# Patient Record
Sex: MALE | Race: OTHER | NOT HISPANIC OR LATINO | URBAN - METROPOLITAN AREA
[De-identification: names, ages, dates, MRNs, and addresses within clinical notes are randomized per-mention and may not be internally consistent; named-entity substitution may affect disease eponyms.]

---

## 2017-12-21 ENCOUNTER — INPATIENT (INPATIENT)
Facility: HOSPITAL | Age: 57
LOS: 3 days | Discharge: HOME | DRG: 60 | End: 2017-12-25
Attending: PSYCHIATRY & NEUROLOGY | Admitting: PSYCHIATRY & NEUROLOGY
Payer: SELF-PAY

## 2017-12-21 VITALS
HEART RATE: 64 BPM | TEMPERATURE: 98 F | OXYGEN SATURATION: 100 % | RESPIRATION RATE: 74 BRPM | WEIGHT: 186.95 LBS | SYSTOLIC BLOOD PRESSURE: 140 MMHG | DIASTOLIC BLOOD PRESSURE: 83 MMHG | HEIGHT: 68 IN

## 2017-12-21 LAB
ALBUMIN SERPL ELPH-MCNC: 4.4 G/DL — SIGNIFICANT CHANGE UP (ref 3.3–5)
ALP SERPL-CCNC: 58 U/L — SIGNIFICANT CHANGE UP (ref 40–120)
ALT FLD-CCNC: 17 U/L RC — SIGNIFICANT CHANGE UP (ref 10–45)
ANION GAP SERPL CALC-SCNC: 12 MMOL/L — SIGNIFICANT CHANGE UP (ref 5–17)
AST SERPL-CCNC: 17 U/L — SIGNIFICANT CHANGE UP (ref 10–40)
BASOPHILS # BLD AUTO: 0 K/UL — SIGNIFICANT CHANGE UP (ref 0–0.2)
BASOPHILS NFR BLD AUTO: 0.3 % — SIGNIFICANT CHANGE UP (ref 0–2)
BILIRUB SERPL-MCNC: 0.2 MG/DL — SIGNIFICANT CHANGE UP (ref 0.2–1.2)
BUN SERPL-MCNC: 20 MG/DL — SIGNIFICANT CHANGE UP (ref 7–23)
CALCIUM SERPL-MCNC: 9.7 MG/DL — SIGNIFICANT CHANGE UP (ref 8.4–10.5)
CHLORIDE SERPL-SCNC: 104 MMOL/L — SIGNIFICANT CHANGE UP (ref 96–108)
CK SERPL-CCNC: 132 U/L — SIGNIFICANT CHANGE UP (ref 30–200)
CO2 SERPL-SCNC: 24 MMOL/L — SIGNIFICANT CHANGE UP (ref 22–31)
CREAT SERPL-MCNC: 0.84 MG/DL — SIGNIFICANT CHANGE UP (ref 0.5–1.3)
CRP SERPL-MCNC: <0.2 MG/DL — SIGNIFICANT CHANGE UP (ref 0–0.4)
EOSINOPHIL # BLD AUTO: 0.3 K/UL — SIGNIFICANT CHANGE UP (ref 0–0.5)
EOSINOPHIL NFR BLD AUTO: 4.1 % — SIGNIFICANT CHANGE UP (ref 0–6)
ERYTHROCYTE [SEDIMENTATION RATE] IN BLOOD: 5 MM/HR — SIGNIFICANT CHANGE UP (ref 0–20)
GLUCOSE SERPL-MCNC: 95 MG/DL — SIGNIFICANT CHANGE UP (ref 70–99)
HCT VFR BLD CALC: 44.7 % — SIGNIFICANT CHANGE UP (ref 39–50)
HGB BLD-MCNC: 15.2 G/DL — SIGNIFICANT CHANGE UP (ref 13–17)
LYMPHOCYTES # BLD AUTO: 2.8 K/UL — SIGNIFICANT CHANGE UP (ref 1–3.3)
LYMPHOCYTES # BLD AUTO: 38.9 % — SIGNIFICANT CHANGE UP (ref 13–44)
MCHC RBC-ENTMCNC: 30.8 PG — SIGNIFICANT CHANGE UP (ref 27–34)
MCHC RBC-ENTMCNC: 34 GM/DL — SIGNIFICANT CHANGE UP (ref 32–36)
MCV RBC AUTO: 90.8 FL — SIGNIFICANT CHANGE UP (ref 80–100)
MONOCYTES # BLD AUTO: 0.5 K/UL — SIGNIFICANT CHANGE UP (ref 0–0.9)
MONOCYTES NFR BLD AUTO: 6.5 % — SIGNIFICANT CHANGE UP (ref 2–14)
NEUTROPHILS # BLD AUTO: 3.7 K/UL — SIGNIFICANT CHANGE UP (ref 1.8–7.4)
NEUTROPHILS NFR BLD AUTO: 50.2 % — SIGNIFICANT CHANGE UP (ref 43–77)
PLATELET # BLD AUTO: 245 K/UL — SIGNIFICANT CHANGE UP (ref 150–400)
POTASSIUM SERPL-MCNC: 4.1 MMOL/L — SIGNIFICANT CHANGE UP (ref 3.5–5.3)
POTASSIUM SERPL-SCNC: 4.1 MMOL/L — SIGNIFICANT CHANGE UP (ref 3.5–5.3)
PROT SERPL-MCNC: 7.6 G/DL — SIGNIFICANT CHANGE UP (ref 6–8.3)
RBC # BLD: 4.92 M/UL — SIGNIFICANT CHANGE UP (ref 4.2–5.8)
RBC # FLD: 11.9 % — SIGNIFICANT CHANGE UP (ref 10.3–14.5)
SODIUM SERPL-SCNC: 140 MMOL/L — SIGNIFICANT CHANGE UP (ref 135–145)
WBC # BLD: 7.3 K/UL — SIGNIFICANT CHANGE UP (ref 3.8–10.5)
WBC # FLD AUTO: 7.3 K/UL — SIGNIFICANT CHANGE UP (ref 3.8–10.5)

## 2017-12-21 PROCEDURE — 70450 CT HEAD/BRAIN W/O DYE: CPT | Mod: 26

## 2017-12-21 PROCEDURE — 99220: CPT

## 2017-12-21 RX ORDER — SODIUM CHLORIDE 9 MG/ML
3 INJECTION INTRAMUSCULAR; INTRAVENOUS; SUBCUTANEOUS EVERY 12 HOURS
Qty: 0 | Refills: 0 | Status: DISCONTINUED | OUTPATIENT
Start: 2017-12-21 | End: 2017-12-25

## 2017-12-21 RX ORDER — IBUPROFEN 200 MG
600 TABLET ORAL ONCE
Qty: 0 | Refills: 0 | Status: COMPLETED | OUTPATIENT
Start: 2017-12-21 | End: 2017-12-21

## 2017-12-21 RX ADMIN — SODIUM CHLORIDE 3 MILLILITER(S): 9 INJECTION INTRAMUSCULAR; INTRAVENOUS; SUBCUTANEOUS at 23:16

## 2017-12-21 RX ADMIN — Medication 600 MILLIGRAM(S): at 23:17

## 2017-12-21 RX ADMIN — Medication 600 MILLIGRAM(S): at 23:44

## 2017-12-21 NOTE — ED PROVIDER NOTE - MEDICAL DECISION MAKING DETAILS
Attending MD Rivera: 57M with 4 days of worsening b/l LE burning/shooting pain and weakness, exam reveals perhaps multilevel weakness LLE, no obvious weakness RLE, reflexes full, ddx includes transverse myelitis, acute cord process, less likely Guillain Turpin given preserved reflexes but could be early in disease. Neurology consulted and will see patient in ED to assist in directed diagnostics

## 2017-12-21 NOTE — ED PROVIDER NOTE - PROGRESS NOTE DETAILS
Dr Hardin cell phone [phone] please call with updates. -Noreen DUNLAP Neuro resident Jamila: does not appreciate significant weakness on focused exam, but functionally weak, not GB, unlikely transverse myelitis, thinks probably slight herniated disc. will speak with attending for possible admission vs CDU for MRI -S Attending MD Rivera: Dr. Hardin  called and he was updated on case

## 2017-12-21 NOTE — ED PROVIDER NOTE - NS ED ROS FT
ROS: no CP/SOB. no cough. no fever. no n/v/d/c. no abd pain. no rash. no bleeding. no urinary complaints. +weakness. no vision changes. no HA. no neck/back pain. no extremity swelling/deformity. No change in mental status.

## 2017-12-21 NOTE — ED PROVIDER NOTE - PHYSICAL EXAMINATION
Attending MD Rivera: A & O x 3, NAD, EOMI b/l, PERRL b/l; lungs CTAB, heart with reg rhythm without murmur; abdomen soft NTND; extremities with no edema; affect appropriate. 5/5 strength RLE in all muscle groups, 4+/5 strength LLE in all muscle groups, No midline spinal tenderness to palpation. 5/5 strength in bilateral lower extremities, sensation grossly intact to light touch throughout bilateral lower extremities 2+ patellar and Achilles reflexes bilaterally

## 2017-12-21 NOTE — ED ADULT NURSE NOTE - OBJECTIVE STATEMENT
57 y.o male presents to the ed c.o b/l lower extremity weakness for four days. denies medical history, appears well. pt states that his legs are burning him, on the right lower extremity its the lateral aspect of his calf and on the left lower extremity the burning starts in his foot and works up his calf. patient's reflexes are intact b/l and muscles are strong. pt is able to ambulate, gait is steady. no neurological deficits noted. PCP Lauri sent the patient in for r/o [place]. denies chest pain, sob, ha, n/v/d, abdominal pain, f/c, urinary symptoms, hematuria. states he had a cough recently, non productive. lung sounds are clear.  Patient undressed and placed into gown, call bell in hand and side rails up for safety. warm blanket provided, vital signs stable, pt in no acute distress.

## 2017-12-21 NOTE — CONSULT NOTE ADULT - ASSESSMENT
58 y/o man with no sig PMH p/w 5 days of bilateral weakness and paresthesias. On exam there was no weakness to direct muscle strength testing, but there was quadriceps and possibly gluteal weakness on functional testing. No sensory deficits were appreciated. Distribution of paresthesias in the upper lateral thigh down into the foot suggest both femoral and sciatic nerve involvement  which includes L3-S1. The leg weakness would suggest L3-L4 root involvement. Pattern of weakness and increased reflexes argues against GBS, and lack of sensory level against transverse myelitis. Possible lumbar plexopathy given distribution. Possible radiculopathy but at this point unlikely. Cord compression also much less likely. Would also rule out parasaggital brain lesion as this can also present with bilateral LE weakness.       Recommend:    - CTH   - MRI lumbar sacral spine w/wo contrast.  - No indication for steroids at this time.   - If no significant pathology, would have patient follow up by next week either at [facility] [phone], or at [facility] [phone] 56 y/o man with no sig PMH p/w 5 days of bilateral weakness and paresthesias. On exam there was no weakness to direct muscle strength testing, but there was quadriceps and possibly gluteal weakness on functional testing. No sensory deficits were appreciated. Distribution of paresthesias in the upper lateral thigh down into the foot along with the findings in the reflexes suggest spinal cord involvement.        Recommend:    - CTH   - MRI spine w/wo contrast.  - No indication for steroids at this time. If positive for TM, will need full w/u with MRI brain and steroids after.  -Admit to neuro

## 2017-12-21 NOTE — ED PROVIDER NOTE - OBJECTIVE STATEMENT
56yo M with LE weakness x4-5 days getting worse, can't stand on one leg, had significant trouble getting up from bed this AM. no UE weakness. no SOB. had some rt sided rib pain yesterday. no trauma. no back pain. no pain down legs. no urinary/bladder incontinence. no paresthesias LE. 3 months ago had URI, no recent GI illness. no recent travel. no fevers. no muscle cramping     PCP- Max Lashawn

## 2017-12-21 NOTE — ED PROVIDER NOTE - ATTENDING CONTRIBUTION TO CARE
Attending MD Rivera:  I personally have seen and examined this patient.  Resident note reviewed and agree on plan of care and except where noted.  See HPI, PE, and MDM for details.

## 2017-12-21 NOTE — CONSULT NOTE ADULT - SUBJECTIVE AND OBJECTIVE BOX
RAAD Wood is a 57y old  Male who presents with a chief complaint of leg weakness    HPI:  56 y/o Kisw[place]ili man with no significant PMH p/w 5 days of bilateral leg weakness and burning sensation in legs and feet. Pt had gradual onset on weakness. He first noticed it while he was driving home from work, felt that he couldn't lift his leg up to push the gas pedal. Associated with burning sensation on the lateral side of his left leg down into the foot, also in the right foot across the side and top of the foot. He says the weakness comes and goes and is better at times. Denies any numbness in the legs, groin numbness, back pain, bowel or bladder incontinence. No recent fevers, URI, or diarrhea. Comes in to the ED today because he was sent by his primary doctor.       MEDICATIONS  (STANDING): none    MEDICATIONS  (PRN):    PAST MEDICAL & SURGICAL HISTORY:  No pertinent past medical history  No significant past surgical history    FAMILY HISTORY: Father with pacemaker    Allergies    Intolerances    SHx - No smoking, No ETOH, No drug abuse      REVIEW OF SYSTEMS:    Constitutional: No fever, weight loss, or fatigue  Eyes: No eye pain, visual disturbances, or discharge  ENMT:  No difficulty hearing, tinnitus, vertigo; No sinus or throat pain  Neck: No pain or stiffness  Respiratory: No cough, wheezing, or shortness of breath  Cardiovascular: No chest pain, palpitations, or leg swelling  Gastrointestinal: No abdominal pain, nausea, vomiting, diarrhea or constipation.   Genitourinary: No dysuria, frequency, hematuria, or incontinence  Neurological: As per HPI  Skin: No itching, burning, rashes, or lesions   Endocrine: No heat or cold intolerance; No hair loss  Musculoskeletal: No joint pain or swelling; No muscle, back, or extremity pain  Psychiatric: No depression, anxiety, mood swings, or difficulty sleeping        Vital Signs Last 24 Hrs  T(C): 37 (21 Dec 2017 17:44), Max: 37 (21 Dec 2017 17:44)  T(F): 98.6 (21 Dec 2017 17:44), Max: 98.6 (21 Dec 2017 17:44)  HR: 63 (21 Dec 2017 17:44) (63 - 64)  BP: 140/85 (21 Dec 2017 17:44) (140/83 - 140/85)  BP(mean): --  RR: 18 (21 Dec 2017 17:44) (18 - 74)  SpO2: 100% (21 Dec 2017 17:44) (100% - 100%)    General Exam:   General appearance: No acute distress    Back: no  paraspinal or central spine tenderness         Neurological Exam:  Mental Status: Orientated to self, date and place.  Attention intact.  No dysarthria. Speech fluent.  Cranial Nerves:   PERRL, EOMI, VFF, no nystagmus.    CN V1-3 intact to light touch .  No facial asymmetry.  Hearing intact bilaterally.  Tongue  midline.  Sternocleidomastoid and Trapezius intact bilaterally.    Motor:   Tone: normal.                  Strength:     [] Upper extremity                      Delt       Bicep    Tricep                                                  R         5/5        5/5        5/5       5/5                                               L          5/5        5/5        5/5       5/5  [] Lower extremity                       HF          KE          KF        DF         PF                                               R        5/5        5/5        5/5       5/5       5/5                                               L         5/5        5/5       5/5       5/5        5/5  On Direct testing    On functional testing pt had difficulty with getting up from squat position.               Dysmetria: None to finger-nose-finger or heel-shin-heel  No truncal ataxia.    Tremor: No resting, postural or action tremor.  No myoclonus.    Sensation: intact to light touch, pinprick, vibration and proprioception throughout, no sensory level.     Deep Tendon Reflexes:     Biceps          Triceps      BR        Patellar        Ankle         Babinski                                  R       2+                   2+           2+            3+               3+           downgoing                                  L        2+                  2+           2+            3+               2+           downgoing    Gait: normal.      Other:    12-21    140  |  104  |  20  ----------------------------<  95  4.1   |  24  |  0.84    Ca    9.7      21 Dec 2017 17:34    TPro  7.6  /  Alb  4.4  /  TBili  0.2  /  DBili  x   /  AST  17  /  ALT  17  /  AlkPhos  58  12-21    ESR   5                        15.2   7.3   )-----------( 245      ( 21 Dec 2017 17:33 )             44.7       Radiology    CT: pending  MRI: pending RAAD Wood is a 57y old  Male who presents with a chief complaint of leg weakness    HPI:  56 y/o Vietnamese man with no significant PMH p/w 5 days of bilateral leg weakness and burning sensation in legs and feet. Pt had gradual onset on weakness. He first noticed it while he was driving home from work, felt that he couldn't lift his leg up to push the gas pedal. Associated with burning sensation on the lateral side of his left leg down into the foot, also in the right foot across the side and top of the foot. He says the weakness comes and goes and is better at times. Denies any numbness in the legs, groin numbness, back pain, bowel or bladder incontinence. No recent fevers, URI, or diarrhea. Comes in to the ED today because he was sent by his primary doctor.       MEDICATIONS  (STANDING): none    MEDICATIONS  (PRN):    PAST MEDICAL & SURGICAL HISTORY:  No pertinent past medical history  No significant past surgical history    FAMILY HISTORY: Father with pacemaker    Allergies    Intolerances    SHx - No smoking, No ETOH, No drug abuse      REVIEW OF SYSTEMS:    Constitutional: No fever, weight loss, or fatigue  Eyes: No eye pain, visual disturbances, or discharge  ENMT:  No difficulty hearing, tinnitus, vertigo; No sinus or throat pain  Neck: No pain or stiffness  Respiratory: No cough, wheezing, or shortness of breath  Cardiovascular: No chest pain, palpitations, or leg swelling  Gastrointestinal: No abdominal pain, nausea, vomiting, diarrhea or constipation.   Genitourinary: No dysuria, frequency, hematuria, or incontinence  Neurological: As per HPI  Skin: No itching, burning, rashes, or lesions   Endocrine: No heat or cold intolerance; No hair loss  Musculoskeletal: No joint pain or swelling; No muscle, back, or extremity pain  Psychiatric: No depression, anxiety, mood swings, or difficulty sleeping        Vital Signs Last 24 Hrs  T(C): 37 (21 Dec 2017 17:44), Max: 37 (21 Dec 2017 17:44)  T(F): 98.6 (21 Dec 2017 17:44), Max: 98.6 (21 Dec 2017 17:44)  HR: 63 (21 Dec 2017 17:44) (63 - 64)  BP: 140/85 (21 Dec 2017 17:44) (140/83 - 140/85)  BP(mean): --  RR: 18 (21 Dec 2017 17:44) (18 - 74)  SpO2: 100% (21 Dec 2017 17:44) (100% - 100%)    General Exam:   General appearance: No acute distress    Back: no  paraspinal or central spine tenderness         Neurological Exam:  Mental Status: Orientated to self, date and place.  Attention intact.  No dysarthria. Speech fluent.  Cranial Nerves:   PERRL, EOMI, VFF, no nystagmus.    CN V1-3 intact to light touch .  No facial asymmetry.  Hearing intact bilaterally.  Tongue  midline.  Sternocleidomastoid and Trapezius intact bilaterally.    Motor:   Tone: slightly increased lower ext.                  Strength:     [] Upper extremity                      Delt       Bicep    Tricep                                                  R         5/5        5/5        5/5       5/5                                               L          5/5        5/5        5/5       5/5  [] Lower extremity                       HF          KE          KF        DF         PF                                               R        5/5        5/5        5/5       5/5       5/5                                               L         5/5        5/5       5/5       5/5        5/5  On Direct testing    On functional testing pt had difficulty with getting up from squat position.               Dysmetria: None to finger-nose-finger or heel-shin-heel  No truncal ataxia.    Tremor: No resting, postural or action tremor.  No myoclonus.    Sensation: intact to light touch, pinprick, vibration and proprioception throughout, no sensory level.     Deep Tendon Reflexes:     Biceps          Triceps      BR        Patellar        Ankle         Babinski                                  R       2+                   2+           2+            3+               3+           up                                  L        2+                  2+           2+            3+               2+           up    Gait: normal.      Other:    12-21    140  |  104  |  20  ----------------------------<  95  4.1   |  24  |  0.84    Ca    9.7      21 Dec 2017 17:34    TPro  7.6  /  Alb  4.4  /  TBili  0.2  /  DBili  x   /  AST  17  /  ALT  17  /  AlkPhos  58  12-21    ESR   5                        15.2   7.3   )-----------( 245      ( 21 Dec 2017 17:33 )             44.7       Radiology    CT: pending  MRI: pending

## 2017-12-22 DIAGNOSIS — R29.898 OTHER SYMPTOMS AND SIGNS INVOLVING THE MUSCULOSKELETAL SYSTEM: ICD-10-CM

## 2017-12-22 LAB — ERYTHROCYTE [SEDIMENTATION RATE] IN BLOOD: 12 MM/HR — SIGNIFICANT CHANGE UP (ref 0–20)

## 2017-12-22 PROCEDURE — 70553 MRI BRAIN STEM W/O & W/DYE: CPT | Mod: 26

## 2017-12-22 PROCEDURE — 71260 CT THORAX DX C+: CPT | Mod: 26

## 2017-12-22 PROCEDURE — 72157 MRI CHEST SPINE W/O & W/DYE: CPT | Mod: 26

## 2017-12-22 PROCEDURE — 99217: CPT

## 2017-12-22 PROCEDURE — 99223 1ST HOSP IP/OBS HIGH 75: CPT

## 2017-12-22 PROCEDURE — 72158 MRI LUMBAR SPINE W/O & W/DYE: CPT | Mod: 26

## 2017-12-22 PROCEDURE — 74177 CT ABD & PELVIS W/CONTRAST: CPT | Mod: 26

## 2017-12-22 PROCEDURE — 72156 MRI NECK SPINE W/O & W/DYE: CPT | Mod: 26

## 2017-12-22 RX ORDER — INFLUENZA VIRUS VACCINE 15; 15; 15; 15 UG/.5ML; UG/.5ML; UG/.5ML; UG/.5ML
0.5 SUSPENSION INTRAMUSCULAR ONCE
Qty: 0 | Refills: 0 | Status: DISCONTINUED | OUTPATIENT
Start: 2017-12-22 | End: 2017-12-25

## 2017-12-22 RX ADMIN — SODIUM CHLORIDE 3 MILLILITER(S): 9 INJECTION INTRAMUSCULAR; INTRAVENOUS; SUBCUTANEOUS at 11:25

## 2017-12-22 RX ADMIN — SODIUM CHLORIDE 3 MILLILITER(S): 9 INJECTION INTRAMUSCULAR; INTRAVENOUS; SUBCUTANEOUS at 19:32

## 2017-12-22 NOTE — H&P ADULT - HISTORY OF PRESENT ILLNESS
58 y/o Surinamese man with no significant PMH p/w 5 days of bilateral leg weakness and burning sensation in legs and feet. Pt had gradual onset on weakness. He first noticed it while he was driving home from work, felt that he couldn't lift his leg up to push the gas pedal. Associated with burning sensation on the lateral side of his left leg down into the foot, also in the right foot across the side and top of the foot. He says the weakness comes and goes and is better at times. Denies any numbness in the legs, groin numbness, back pain, bowel or bladder incontinence. No recent fevers, URI, or diarrhea. Comes in to the ED today because he was sent by his primary doctor.     MRI spine w/ contrast showing Nonspecific abnormal signal intensity and enhancement of the right nory-thoracic cord at the level of T3-T4, may represent an infectious inflammatory or demyelinating process.   Patient admitted for w/u of transverse myelitis.

## 2017-12-22 NOTE — ED CDU PROVIDER SUBSEQUENT DAY NOTE - NEURO NEGATIVE STATEMENT, MLM
no loss of consciousness, no gait abnormality, no headache, no sensory deficits, and no weakness. no loss of consciousness, no gait abnormality, no headache, no sensory deficits, + weakness.

## 2017-12-22 NOTE — H&P ADULT - ASSESSMENT
56 y/o man with no sig PMH p/w 5 days of bilateral weakness and paresthesias. On exam there was no weakness to direct muscle strength testing, but there was quadriceps and possibly gluteal weakness on functional testing. No sensory deficits were appreciated. Distribution of paresthesias in the upper lateral thigh down into the foot along with the findings in the reflexes suggest spinal cord involvement.   MRI spine results suggestive of transverse myelitis.    Plan:  - MRI brain w/wo contrast for any demyelinating lesions  - LP for CSF analysis to confirm inflammatory/infectious etiology  - full rheumatological w/u

## 2017-12-22 NOTE — ED CDU PROVIDER INITIAL DAY NOTE - DETAILS
frequent reeval, vitals q 4hrs, tele, neurochecks q 4hrs, MRI L/S spine, neuro following  d/w attending

## 2017-12-22 NOTE — ED CDU PROVIDER DISPOSITION NOTE - ATTENDING CONTRIBUTION TO CARE
I performed a history and physical exam of the patient and discussed their management with the Advanced Care Practitioner. I reviewed the ACP's note and agree with the documented findings and plan of care.     Helio CARUSO: Patient is a 56 yo M here for b/l leg weakness x 5 days. No f/c/n/v/upper extremity weaknes, change in vision, headache. Patient here for MRI of spine to evaluate for transverse myelitis. exam: no focal neuro deficit a/p: r/o transverse myelitis; MRI concerning for thoracic transverse myelitis. Patient admitted per neuro recommendation.

## 2017-12-22 NOTE — ED CDU PROVIDER SUBSEQUENT DAY NOTE - MEDICAL DECISION MAKING DETAILS
Helio CARUSO: Patient is a 58 yo M here for b/l leg weakness x 5 days. No f/c/n/v/upper extremity weaknes, change in vision, headache. Patient here for MRI of spine to evaluate for transverse myelitis. exam: no focal neuro deficit a/p: r/o transverse myelitis; MRI per neuro

## 2017-12-22 NOTE — ED CDU PROVIDER INITIAL DAY NOTE - OBJECTIVE STATEMENT
56yo M no pmhx with c/o b/l LE weakness x 4-5 days constant- not improving. pt went to PCP who sent him here for evaluation. mild-moderate R sided pricking pain that started a couple days ago. otherwise no pain.      no UE weakness. no SOB. no cp. no trauma. no back pain. no pain down legs. no urinary/bladder incontinence. no paresthesias LE. 3 months ago had URI, no recent GI illness. no recent travel. no fevers. no muscle cramping     PCP- Max Lashawn

## 2017-12-22 NOTE — ED CDU PROVIDER INITIAL DAY NOTE - CHPI ED SYMPTOMS NEG
no change in level of consciousness/no blurred vision/no vomiting/no loss of consciousness/no nausea/no fever/no confusion/no dizziness/no numbness

## 2017-12-22 NOTE — H&P ADULT - MOTOR
Tone: slightly increased lower ext.                  Strength:     [] Upper extremity                      Delt       Bicep    Tricep                                                  R         5/5 5/5 5/5 5/5                                               L          5/5 5/5 5/5 5/5  [] Lower extremity                       HF          KE          KF        DF         PF                                               R        5/5 5/5 5/5 5/5 5/5                                               L         5/5 5/5 5/5 5/5 5/5  On Direct testing    On functional testing pt had difficulty with getting up from squat position.  Dysmetria: None to finger-nose-finger or heel-shin-heel  No truncal ataxia.    Tremor: No resting, postural or action tremor.  No myoclonus.  Deep Tendon Reflexes:     Biceps          Triceps      BR        Patellar        Ankle         Babinski                                  R       2+                   2+           2+            3+               3+           up                                  L        2+                  2+           2+            3+               2+           up

## 2017-12-22 NOTE — ED CDU PROVIDER INITIAL DAY NOTE - ATTENDING CONTRIBUTION TO CARE
Attending MD Rivera:   I personally have seen and examined this patient.  Physician assistant note reviewed and agree on plan of care and except where noted.  See HPI, PE, and MDM for details.

## 2017-12-22 NOTE — ED CDU PROVIDER DISPOSITION NOTE - CLINICAL COURSE
58yo M no pmhx with c/o b/l LE weakness x 4-5 days constant- not improving. pt went to PCP who sent him here for evaluation. mild-moderate R sided pricking pain that started a couple days ago. otherwise no pain. pt evaluation by Neurology, recommended pt be sent to CDU for observation including MRI. pt sent to CDU for neurologic checks q 4hrs and MRI. pt continued to have weakness to b/l LE overnight but no new or worsening of symptoms. 56yo M no pmhx with c/o b/l LE weakness x 4-5 days constant- not improving. pt went to PCP who sent him here for evaluation. mild-moderate R sided pricking pain that started a couple days ago. otherwise no pain. pt evaluation by Neurology, recommended pt be sent to CDU for observation including MRI. pt sent to CDU for neurologic checks q 4hrs and MRI. pt continued to have weakness to b/l LE overnight but no new or worsening of symptoms. Patient's MRI results significant for abnormal signal intensity and enhancement of the right nory-thoracic cord at the level of T3-T4, may represent an infectious inflammatory or demyelinating process. Pt re-evaluated by neurology and admitted to neuro service for transverse myelitis treatment and further workup.

## 2017-12-22 NOTE — ED ADULT NURSE REASSESSMENT NOTE - NS ED NURSE REASSESS COMMENT FT1
Pt admitted to [address] [place] [address] 473w report given to Kerry RN, VSS no complaints, neuro exam unchanged, pending transport. Safety and comfort maintained, family at bedside.
A/O x3, NAD ambulates to bathroom sans problems. Leg strong bilaterally, R > L,  both strong but weaker than hands. States he lifts his legs and walks ok, but can't lift his legs to get in and out of his car. See chart for neuro sheet.
Pt resting comfortably on stretcher, no complaints, VSS, neuro exam unchanged. Monitoring continues. Safety and comfort maintained.
Report received from Sia AMATO at 1900. Patient A&Ox3. Head CT negative. Patient placed in CDU, report given to Kira AMATO. VSS
Retuned from MRI, A/O x3, NAD, breakfast at bedside.
patient sitting up on stretcher, in no acute distress. pending neuro consult. see neuro paper sheet in chart.
@[id number] Rec'd report. A/O x3, NAD.
Received pt from LIMA Good , received pt alert and responsive, oriented x4, denies any respiratory distress, SOB, or difficulty breathing. Pt transferred to CDU for observation for BLE weakness, denies any other neuro symptoms, neuro intact. c/o pain to R underarm will medicate as ordered, IV in place, patent and free of signs of infiltration, pt denies chest pain or palpitations, V/S stable, pt afebrile, Pt educated on unit and unit rules, instructed patient to notify RN of any needed assistance, Pt verbalizes understanding, Call bell placed within reach. Safety maintained. Will continue to monitor. Pending MRI.

## 2017-12-22 NOTE — ED CDU PROVIDER SUBSEQUENT DAY NOTE - HISTORY
CDU NOTE JUAN Jackson: pt resting comfortably, feels well without complaint. pt reports Ibuprofen helped relieve pain at R side, pain resolved. pt does report some improvement of b/l leg weakness. no new symptoms. denies cp/sob/dyspnea, dizziness, numbness. NAD recent VSS. neuro exam- no focal deficit. M/S intact x 4.

## 2017-12-22 NOTE — ED CDU PROVIDER DISPOSITION NOTE - PLAN OF CARE
1. stay hydrated.  2. rest.  3. Follow up with your PCP Dr. Hardin in 1 -2 days.  4. Follow up with Neurology #[phone] in 2-3 days.  5. Bring Printed Results to your Doctor Visits.   6. Return if symptoms worsen, fever, weakness, numbness, dizziness, vision change, slurred speech and all other concerns

## 2017-12-22 NOTE — ED CDU PROVIDER SUBSEQUENT DAY NOTE - PROGRESS NOTE DETAILS
CDU NOTE JUAN Jackson: pt asleep. NAD. Patient seen and evaluated at bedside. Resting comfortably, NAD. VSS. Neuro exam WNL, no weakness appreciated at this time. Pt seen by neurology team and attending, requesting MRI of entire spine. Patient resting comfortably. NAD. No complaints. VSS. Awaiting MRI results. Patient's MRI results significant for abnormal signal intensity and enhancement of the right nory-thoracic cord at the level of T3-T4, may represent an infectious inflammatory or demyelinating process. Pt re-evaluated by neurology and to be admitted to neuro service for transverse myelitis. D/w Dr. Cadet.

## 2017-12-22 NOTE — H&P ADULT - CRANIAL NERVE
PERRL, EOMI, VFF, no nystagmus.    CN V1-3 intact to light touch .  No facial asymmetry.  Hearing intact bilaterally.  Tongue  midline.  Sternocleidomastoid and Trapezius intact bilaterally.

## 2017-12-23 ENCOUNTER — RESULT REVIEW (OUTPATIENT)
Age: 57
End: 2017-12-23

## 2017-12-23 LAB
APPEARANCE CSF: CLEAR — SIGNIFICANT CHANGE UP
APPEARANCE CSF: CLEAR — SIGNIFICANT CHANGE UP
B BURGDOR C6 AB SER-ACNC: NEGATIVE — SIGNIFICANT CHANGE UP
B BURGDOR IGG+IGM SER-ACNC: 0.15 INDEX — SIGNIFICANT CHANGE UP (ref 0.01–0.89)
CERULOPLASMIN SERPL-MCNC: 24 MG/DL — SIGNIFICANT CHANGE UP (ref 20–60)
COLOR CSF: SIGNIFICANT CHANGE UP
COLOR CSF: SIGNIFICANT CHANGE UP
CRP SERPL-MCNC: <0.2 MG/DL — SIGNIFICANT CHANGE UP (ref 0–0.4)
DSDNA AB FLD-ACNC: <0.2 AI — SIGNIFICANT CHANGE UP
ENA SS-A AB FLD IA-ACNC: <0.2 AI — SIGNIFICANT CHANGE UP
GLUCOSE BLDC GLUCOMTR-MCNC: 114 MG/DL — HIGH (ref 70–99)
GLUCOSE CSF-MCNC: 71 MG/DL — HIGH (ref 40–70)
GRAM STN FLD: SIGNIFICANT CHANGE UP
LABORATORY COMMENT REPORT: SIGNIFICANT CHANGE UP
LYMPHOCYTES # CSF: 88 % — HIGH (ref 40–80)
LYMPHOCYTES # CSF: 95 % — HIGH (ref 40–80)
MONOS+MACROS NFR CSF: 5 % — LOW (ref 15–45)
MONOS+MACROS NFR CSF: 9 % — LOW (ref 15–45)
NEUTROPHILS # CSF: 0 % — SIGNIFICANT CHANGE UP (ref 0–6)
NEUTROPHILS # CSF: 3 % — SIGNIFICANT CHANGE UP (ref 0–6)
NIGHT BLUE STAIN TISS: SIGNIFICANT CHANGE UP
NRBC NFR CSF: 2 /UL — SIGNIFICANT CHANGE UP (ref 0–5)
NRBC NFR CSF: 3 /UL — SIGNIFICANT CHANGE UP (ref 0–5)
PROT CSF-MCNC: 39 MG/DL — SIGNIFICANT CHANGE UP (ref 15–45)
RBC # CSF: 0 /UL — SIGNIFICANT CHANGE UP (ref 0–0)
RBC # CSF: 77 /UL — HIGH (ref 0–0)
RHEUMATOID FACT SERPL-ACNC: <7 IU/ML — SIGNIFICANT CHANGE UP (ref 0–13.9)
SOURCE HSV 1/2: SIGNIFICANT CHANGE UP
SPECIMEN SOURCE: SIGNIFICANT CHANGE UP
SPECIMEN SOURCE: SIGNIFICANT CHANGE UP
T PALLIDUM AB TITR SER: NEGATIVE — SIGNIFICANT CHANGE UP
TSH SERPL-MCNC: 4.82 UIU/ML — HIGH (ref 0.27–4.2)
TUBE TYPE: SIGNIFICANT CHANGE UP
TUBE TYPE: SIGNIFICANT CHANGE UP
VIT B12 SERPL-MCNC: 452 PG/ML — SIGNIFICANT CHANGE UP (ref 232–1245)
VIT D25+D1,25 OH+D1,25 PNL SERPL-MCNC: 68.3 PG/ML — SIGNIFICANT CHANGE UP (ref 19.9–79.3)

## 2017-12-23 PROCEDURE — 88188 FLOWCYTOMETRY/READ 9-15: CPT

## 2017-12-23 PROCEDURE — 88108 CYTOPATH CONCENTRATE TECH: CPT | Mod: 26,59

## 2017-12-23 PROCEDURE — 99233 SBSQ HOSP IP/OBS HIGH 50: CPT

## 2017-12-23 RX ORDER — PANTOPRAZOLE SODIUM 20 MG/1
40 TABLET, DELAYED RELEASE ORAL
Qty: 0 | Refills: 0 | Status: DISCONTINUED | OUTPATIENT
Start: 2017-12-23 | End: 2017-12-25

## 2017-12-23 RX ORDER — INSULIN LISPRO 100/ML
VIAL (ML) SUBCUTANEOUS
Qty: 0 | Refills: 0 | Status: DISCONTINUED | OUTPATIENT
Start: 2017-12-23 | End: 2017-12-25

## 2017-12-23 RX ADMIN — Medication 58 MILLIGRAM(S): at 16:41

## 2017-12-23 RX ADMIN — SODIUM CHLORIDE 3 MILLILITER(S): 9 INJECTION INTRAMUSCULAR; INTRAVENOUS; SUBCUTANEOUS at 16:44

## 2017-12-23 RX ADMIN — SODIUM CHLORIDE 3 MILLILITER(S): 9 INJECTION INTRAMUSCULAR; INTRAVENOUS; SUBCUTANEOUS at 05:00

## 2017-12-23 RX ADMIN — PANTOPRAZOLE SODIUM 40 MILLIGRAM(S): 20 TABLET, DELAYED RELEASE ORAL at 19:22

## 2017-12-23 NOTE — PROGRESS NOTE ADULT - SUBJECTIVE AND OBJECTIVE BOX
Neurology Progress Note    No acute overnight events. Pt denies new weakness, bowel/bladder incontinence.     MEDICATIONS  (STANDING):  influenza   Vaccine 0.5 milliLiter(s) IntraMuscular once  sodium chloride 0.9% lock flush 3 milliLiter(s) IV Push every 12 hours    Objective:   Vital Signs Last 24 Hrs  T(C): 36.6 (23 Dec 2017 05:07), Max: 37.1 (22 Dec 2017 16:00)  T(F): 97.9 (23 Dec 2017 05:07), Max: 98.7 (22 Dec 2017 16:00)  HR: 58 (23 Dec 2017 05:07) (58 - 68)  BP: 138/88 (23 Dec 2017 05:07) (124/82 - 150/90)  BP(mean): --  RR: 18 (23 Dec 2017 05:07) (17 - 18)  SpO2: 96% (23 Dec 2017 05:07) (96% - 98%)    General Adult Exam  GENERAL APPEARANCE: Well developed, well nourished, alert and cooperative, and appears to be in no acute distress.    Neurological Exam:  Mental Status: AAOx3, speech fluent, follows commands    Cranial Nerves: PERRL, EOMI, VFF, no facial asymmetry. Tongue, uvula and palate midline. No dysarthria.    Motor:   Tone: normal.                  Strength:     [] Upper extremity                           Delt       Bicep    Tricep                                                  R         5/5        5/5        5/5       5/5                                               L          5/5        5/5        5/5       5/5  [] Lower extremity                       HF          KE          KF        DF         PF                                               R        5/5        5/5        5/5       5/5       5/5                                               L         5/5        5/5       5/5       5/5        5/5  Tremor: No resting, postural or action tremor.  No myoclonus.    Sensation: intact to light touch and pinprick x 4 extremities. No sensory level.     Deep Tendon Reflexes: 2+ bilateral biceps, triceps, brachioradialis, 3+ b/l knee, 2+ L ankle, 3+ R ankle   Toes extensor b/l    Coord: No ataxia/dysmetria FTN b/l    Other:    Borrelia burgdorferi IgG/IgM Antibodies (12.22.17 @ 21:59)    LYME IgG/IgM Antibodies Result: 0.15 Index    Lyme C6 Interpretation: Negative: METHOD: KASANDRA      Reference Range: (values expressed as Lyme Index )                                < 0.90        Negative                                0.90 - 1.09   Equivocal                                >= 1.10        Positive      CDC/ASTPHLDGuidelines recommend that all samples judged equivocal or      positive be re-tested by immunoblot for confirmation of results.    Vitamin B12, Serum (12.22.17 @ 21:59)    Vitamin B12, Serum: 452: Note: Reference Range Change on 12/18/2017. pg/mL    Sedimentation Rate, Erythrocyte: 12 mm/hr (12.22.17 @ 22:05)    Thyroid Stimulating Hormone, Serum (12.22.17 @ 21:59)    Thyroid Stimulating Hormone, Serum: 4.82 uIU/mL    Radiology    < from: MR Thoracic Spine w/wo IV Cont (12.22.17 @ 10:41) >  IMPRESSION:      CERVICAL SPINE:  Unremarkable cervical spine MRI, without abnormal cord signal intensity   or enhancement.    THORACIC SPINE:  Nonspecific abnormal signal intensity and enhancement of the right   nory-thoracic cord at the level of T3-T4, may represent an infectious   inflammatory or demyelinating process. No richard thoracic cord compression.    Multilevel small disc protrusions, which do not contact the cord.    LUMBAR SPINE:  Unremarkable limited lumbar spine MRI    CT C/A/P: Prelim no emergent findings Neurology Progress Note    No acute overnight events. Pt denies new weakness, bowel/bladder incontinence.     MEDICATIONS  (STANDING):  influenza   Vaccine 0.5 milliLiter(s) IntraMuscular once  sodium chloride 0.9% lock flush 3 milliLiter(s) IV Push every 12 hours    Objective:   Vital Signs Last 24 Hrs  T(C): 36.6 (23 Dec 2017 05:07), Max: 37.1 (22 Dec 2017 16:00)  T(F): 97.9 (23 Dec 2017 05:07), Max: 98.7 (22 Dec 2017 16:00)  HR: 58 (23 Dec 2017 05:07) (58 - 68)  BP: 138/88 (23 Dec 2017 05:07) (124/82 - 150/90)  BP(mean): --  RR: 18 (23 Dec 2017 05:07) (17 - 18)  SpO2: 96% (23 Dec 2017 05:07) (96% - 98%)    General Adult Exam  GENERAL APPEARANCE: Well developed, well nourished, alert and cooperative, and appears to be in no acute distress.    Neurological Exam:  Mental Status: AAOx3, speech fluent, follows commands    Cranial Nerves: PERRL, EOMI, VFF, no facial asymmetry. Tongue, uvula and palate midline. No dysarthria.    Motor:   Tone: normal.                  Strength:     [] Upper extremity                           Delt       Bicep    Tricep                                                  R         5/5        5/5        5/5       5/5                                               L          5/5        5/5        5/5       5/5  [] Lower extremity                       HF          KE          KF        DF         PF                                               R        5/5        5/5        5/5       5/5       5/5                                               L         5/5        5/5       5/5       5/5        5/5  Tremor: No resting, postural or action tremor.  No myoclonus.    Sensation: intact to light touch and pinprick x 4 extremities. No sensory level.     Deep Tendon Reflexes: 2+ bilateral biceps, triceps, brachioradialis, 3+ b/l knee, 2+ L ankle, 3+ R ankle   Toes extensor b/l. + 1-2 beats of clonus L ankle    Coord: No ataxia/dysmetria FTN b/l    Other:    Borrelia burgdorferi IgG/IgM Antibodies (12.22.17 @ 21:59)    LYME IgG/IgM Antibodies Result: 0.15 Index    Lyme C6 Interpretation: Negative: METHOD: KASANDRA      Reference Range: (values expressed as Lyme Index )                                < 0.90        Negative                                0.90 - 1.09   Equivocal                                >= 1.10        Positive      CDC/ASTPHLDGuidelines recommend that all samples judged equivocal or      positive be re-tested by immunoblot for confirmation of results.    Vitamin B12, Serum (12.22.17 @ 21:59)    Vitamin B12, Serum: 452: Note: Reference Range Change on 12/18/2017. pg/mL    Sedimentation Rate, Erythrocyte: 12 mm/hr (12.22.17 @ 22:05)    Thyroid Stimulating Hormone, Serum (12.22.17 @ 21:59)    Thyroid Stimulating Hormone, Serum: 4.82 uIU/mL    Radiology    < from: MR Thoracic Spine w/wo IV Cont (12.22.17 @ 10:41) >  IMPRESSION:      CERVICAL SPINE:  Unremarkable cervical spine MRI, without abnormal cord signal intensity   or enhancement.    THORACIC SPINE:  Nonspecific abnormal signal intensity and enhancement of the right   nory-thoracic cord at the level of T3-T4, may represent an infectious   inflammatory or demyelinating process. No richard thoracic cord compression.    Multilevel small disc protrusions, which do not contact the cord.    LUMBAR SPINE:  Unremarkable limited lumbar spine MRI    MRI brain pending read    CT C/A/P: Prelim no emergent findings

## 2017-12-23 NOTE — PROGRESS NOTE ADULT - ASSESSMENT
56 y/o man with no significant PMH p/w 5 days of bilateral weakness and paresthesias. Exam reveals hyperreflexia and extensor plantar response concerning for myelopathy. MRI spine results suggestive of transverse myelitis in R nory-thoracic cord at T3-4. Neurologic exam is stable. Patient is undergoing continued workup for etiology- infectious vs inflammatory vs demyelinating.     - LP at bedside today   - F/u official read CT C/A/P  - F/u remaining rheumatologic workup 56 y/o man with no significant PMH p/w 5 days of bilateral weakness and paresthesias. Exam reveals hyperreflexia and extensor plantar response concerning for myelopathy. MRI spine results suggestive of transverse myelitis in R nory-thoracic cord at T3-4. Neurologic exam is stable. Patient is undergoing continued workup for etiology- infectious vs inflammatory vs demyelinating, autoimmune disorder such as MS higher on the differential given preliminary neuroimaging.     - LP at bedside today- CSF gluc, prot, cell count, gram stain, HSV, cytology, flow cytometry, MBP, IgG index, oligoclonal bands  - Send serum NMO Ab  - TSH mildly elevated- will check FT3/T4  - F/u official read CT C/A/P  - F/u official read MRI brain  - F/u remaining rheumatologic workup   - PT

## 2017-12-24 ENCOUNTER — TRANSCRIPTION ENCOUNTER (OUTPATIENT)
Age: 57
End: 2017-12-24

## 2017-12-24 LAB
ANA PAT FLD IF-IMP: ABNORMAL
ANA PATTERN 2: ABNORMAL
ANA TITR SER: ABNORMAL
ANTI NUCLEAR FACTOR TITER 2: ABNORMAL
AUTO DIFF PNL BLD: NEGATIVE — SIGNIFICANT CHANGE UP
C-ANCA SER-ACNC: NEGATIVE — SIGNIFICANT CHANGE UP
GLUCOSE BLDC GLUCOMTR-MCNC: 147 MG/DL — HIGH (ref 70–99)
GLUCOSE BLDC GLUCOMTR-MCNC: 162 MG/DL — HIGH (ref 70–99)
GLUCOSE BLDC GLUCOMTR-MCNC: 201 MG/DL — HIGH (ref 70–99)
GLUCOSE BLDC GLUCOMTR-MCNC: 237 MG/DL — HIGH (ref 70–99)
P-ANCA SER-ACNC: NEGATIVE — SIGNIFICANT CHANGE UP
T3FREE SERPL-MCNC: 2.52 PG/ML — SIGNIFICANT CHANGE UP (ref 1.8–4.6)
T4 AB SER-ACNC: 7 UG/DL — SIGNIFICANT CHANGE UP (ref 4.6–12)

## 2017-12-24 PROCEDURE — 99233 SBSQ HOSP IP/OBS HIGH 50: CPT

## 2017-12-24 RX ADMIN — Medication 2: at 13:04

## 2017-12-24 RX ADMIN — SODIUM CHLORIDE 3 MILLILITER(S): 9 INJECTION INTRAMUSCULAR; INTRAVENOUS; SUBCUTANEOUS at 05:20

## 2017-12-24 RX ADMIN — PANTOPRAZOLE SODIUM 40 MILLIGRAM(S): 20 TABLET, DELAYED RELEASE ORAL at 05:27

## 2017-12-24 RX ADMIN — Medication 58 MILLIGRAM(S): at 05:27

## 2017-12-24 RX ADMIN — SODIUM CHLORIDE 3 MILLILITER(S): 9 INJECTION INTRAMUSCULAR; INTRAVENOUS; SUBCUTANEOUS at 18:03

## 2017-12-24 RX ADMIN — Medication 2: at 08:36

## 2017-12-24 NOTE — CONSULT NOTE ADULT - SUBJECTIVE AND OBJECTIVE BOX
CHIEF COMPLAINT:    HPI:  56 yo M with no significant PMH. Presents with 5 days of bilateral leg weakness and burning sensation in legs and feet. Pt had gradual onset on weakness and Associated with burning sensation on the lateral side of his left leg down into the foot, also in the right foot across the side and top of the foot. He says the weakness comes and goes and is better at times. Denies any numbness in the legs, groin numbness, back pain, bowel or bladder incontinence. No recent fevers, URI, or diarrhea. Pt is being worked up for myelopathy. Incidental 3 mm GRAYSON nodule was seen.       PAST MEDICAL & SURGICAL HISTORY:  No pertinent past medical history  No significant past surgical history      FAMILY HISTORY:  Family history of coronary artery disease      SOCIAL HISTORY:  Smoking: [ ] Never Smoked [ ] Former Smoker (__ packs x ___ years) [ ] Current Smoker  (__ packs x ___ years)  Substance Use:   EtOH Use:  Marital Status: [ ] Single [ ]  [ ]  [ ]   Sexual History:   Occupation:  Recent Travel:  Country of Birth: [facility]    Allergies    No Known Allergies    Intolerances        HOME MEDICATIONS:    REVIEW OF SYSTEMS:  Constitutional: [ ] fevers [ ] chills [ ] weight loss [ ] weight gain  HEENT: [ ] dry eyes [ ] eye irritation [ ] postnasal drip [ ] nasal congestion  CV: [ ] chest pain [ ] orthopnea [ ] palpitations [ ] murmur  Resp: [ ] cough [ ] shortness of breath [ ] dyspnea [ ] wheezing [ ] sputum [ ] hemoptysis  GI: [ ] nausea [ ] vomiting [ ] diarrhea [ ] constipation [ ] abd pain [ ] dysphagia   : [ ] dysuria [ ] nocturia [ ] hematuria [ ] increased urinary frequency  Musculoskeletal: [ ] back pain [ ] myalgias [ ] arthralgias [ ] fracture  Skin: [ ] rash [ ] itch  Neurological: [ ] headache [ ] dizziness [ ] syncope [ ] weakness [ ] numbness  [ ] All other systems negative  [ ] Unable to assess ROS because ________    OBJECTIVE:  ICU Vital Signs Last 24 Hrs  T(C): 37.1 (24 Dec 2017 07:45), Max: 37.1 (24 Dec 2017 07:45)  T(F): 98.7 (24 Dec 2017 07:45), Max: 98.7 (24 Dec 2017 07:45)  HR: 100 (24 Dec 2017 07:45) (70 - 100)  BP: 120/82 (24 Dec 2017 07:45) (118/773 - 135/64)  BP(mean): --  ABP: --  ABP(mean): --  RR: 18 (24 Dec 2017 07:45) (16 - 18)  SpO2: 98% (24 Dec 2017 07:45) (95% - 98%)        CAPILLARY BLOOD GLUCOSE      POCT Blood Glucose.: 201 mg/dL (24 Dec 2017 07:52)      PHYSICAL EXAM:  General:   HEENT:   Lymph Nodes:  Neck:   Respiratory:   Cardiovascular:   Abdomen:   Extremities:   Skin:   Neurological:  Psychiatry:    HOSPITAL MEDICATIONS:  MEDICATIONS  (STANDING):  influenza   Vaccine 0.5 milliLiter(s) IntraMuscular once  insulin lispro (HumaLOG) corrective regimen sliding scale   SubCutaneous three times a day before meals  methylPREDNISolone sodium succinate IVPB 1000 milliGRAM(s) IV Intermittent daily  pantoprazole    Tablet 40 milliGRAM(s) Oral before breakfast  sodium chloride 0.9% lock flush 3 milliLiter(s) IV Push every 12 hours    MEDICATIONS  (PRN):      LABS:    Hgb Trend: 15.2<--        Creatinine Trend: 0.84<--            MICROBIOLOGY:     RADIOLOGY:  [ ] Reviewed and interpreted by me    PULMONARY FUNCTION TESTS:    EKG: CHIEF COMPLAINT:    HPI:  56 yo M with no significant PMH. Presents with 5 days of bilateral leg weakness and burning sensation in legs and feet. Denies any numbness in the legs, groin numbness, back pain, bowel or bladder incontinence. No recent fevers, URI, or diarrhea. Pt is being worked up for myelopathy, possible transverse myelitis. Incidental 3 mm GRAYSON nodule was seen on CT chest. Pt states that around 2 months ago he had a upper respiratory symptoms with a dry cough that has since resolved. He has no respiratory symptoms otherwise and no exercise limitations. He denies CP, cough, SOB, MATUTE, rash, joint pain or weight changes. He was born in [place] and immigrated in 1987. No hx of TB or TB exposure. No recent travel    PAST MEDICAL & SURGICAL HISTORY:  Kidney stone   No pertinent past medical history  No significant past surgical history      FAMILY HISTORY:  Family history of coronary artery disease      SOCIAL HISTORY:  Smoking: [x ] Never Smoked   Substance Use: denies  EtOH Use: denies  Marital Status: [ ] Single [ ]  [ ]  [ ]   Occupation: transportation dispatch  Recent Travel: none  Country of Birth: [place]    Allergies    No Known Allergies    Intolerances        HOME MEDICATIONS:  None    REVIEW OF SYSTEMS:  Constitutional: [- ] fevers [- ] chills [- ] weight loss [- ] weight gain  HEENT: [ ] dry eyes [ ] eye irritation [- ] postnasal drip [- ] nasal congestion  CV: [- ] chest pain [- ] orthopnea [ ] palpitations [ ] murmur  Resp: [- ] cough [ -] shortness of breath [- ] dyspnea [ ] wheezing [ -] sputum [ ] hemoptysis  GI: [ -] nausea [ -] vomiting [ -] diarrhea [ ] constipation [ -] abd pain [ ] dysphagia   Musculoskeletal: [- ] back pain [ ] myalgias [ ] arthralgias [ ] fracture  Skin: [ -] rash [ ] itch  Neurological: [ ] headache [ ] dizziness [ ] syncope [+ ] weakness [- ] numbness  [ ] All other systems negative  [ ] Unable to assess ROS because ________    OBJECTIVE:  ICU Vital Signs Last 24 Hrs  T(C): 37.1 (24 Dec 2017 07:45), Max: 37.1 (24 Dec 2017 07:45)  T(F): 98.7 (24 Dec 2017 07:45), Max: 98.7 (24 Dec 2017 07:45)  HR: 100 (24 Dec 2017 07:45) (70 - 100)  BP: 120/82 (24 Dec 2017 07:45) (118/773 - 135/64)  BP(mean): --  ABP: --  ABP(mean): --  RR: 18 (24 Dec 2017 07:45) (16 - 18)  SpO2: 98% (24 Dec 2017 07:45) (95% - 98%)        CAPILLARY BLOOD GLUCOSE      POCT Blood Glucose.: 201 mg/dL (24 Dec 2017 07:52)      PHYSICAL EXAM:  General: NAD, non-toxic appearing  HEENT: EOMI, MMM  Lymph Nodes: no cervical or supraclavicular LAD palpated  Neck: supple, no JVD  Respiratory: clear to auscultation b/l  Cardiovascular: s1s2 RRR  Abdomen: soft, non tender, mild distension  Extremities: warm, no edema, no clubbing  Skin: intact  Neurological: normal strength b/l UE/LE, non-focal  Psychiatry: AAOx3d    HOSPITAL MEDICATIONS:  MEDICATIONS  (STANDING):  influenza   Vaccine 0.5 milliLiter(s) IntraMuscular once  insulin lispro (HumaLOG) corrective regimen sliding scale   SubCutaneous three times a day before meals  methylPREDNISolone sodium succinate IVPB 1000 milliGRAM(s) IV Intermittent daily  pantoprazole    Tablet 40 milliGRAM(s) Oral before breakfast  sodium chloride 0.9% lock flush 3 milliLiter(s) IV Push every 12 hours    MEDICATIONS  (PRN):      LABS:    Hgb Trend: 15.2<--        Creatinine Trend: 0.84<--            MICROBIOLOGY:     RADIOLOGY:  [x ] Reviewed and interpreted by me  < from: CT Chest w/ IV Cont (12.22.17 @ 19:32) >  IMPRESSION:     Bilateral nonobstructing intrarenal calculi.    An indeterminate 3 mm left upper lobe pulmonary nodule. In a patient with   risk factors for which follow-up is desired, noncontrast chest CT may be   repeated in 12 months to confirm stability.    < end of copied text >

## 2017-12-24 NOTE — DISCHARGE NOTE ADULT - PROVIDER TOKENS
FREE:[LAST:[Markell],FIRST:[Guilherme],PHONE:[phone]],FAX:[phone]],ADDRESS:[[address] [place] [address]]]

## 2017-12-24 NOTE — PROGRESS NOTE ADULT - SUBJECTIVE AND OBJECTIVE BOX
Neurology Follow up note    Name  RAAD ESPINOSA        Subjective: Saw and examined patient at bedside. Patient reports that his weakness is much better. He contines to have burning sensation in the legs b/l but it has subsided since his steroid dose was given this morning.     MEDICATIONS  (STANDING):  influenza   Vaccine 0.5 milliLiter(s) IntraMuscular once  insulin lispro (HumaLOG) corrective regimen sliding scale   SubCutaneous three times a day before meals  methylPREDNISolone sodium succinate IVPB 1000 milliGRAM(s) IV Intermittent daily  pantoprazole    Tablet 40 milliGRAM(s) Oral before breakfast  sodium chloride 0.9% lock flush 3 milliLiter(s) IV Push every 12 hours    MEDICATIONS  (PRN):      Allergies    No Known Allergies    Intolerances        Objective:   Vital Signs Last 24 Hrs  T(C): 37.1 (24 Dec 2017 07:45), Max: 37.1 (24 Dec 2017 07:45)  T(F): 98.7 (24 Dec 2017 07:45), Max: 98.7 (24 Dec 2017 07:45)  HR: 100 (24 Dec 2017 07:45) (70 - 100)  BP: 120/82 (24 Dec 2017 07:45) (118/773 - 135/64)  BP(mean): --  RR: 18 (24 Dec 2017 07:45) (16 - 18)  SpO2: 98% (24 Dec 2017 07:45) (95% - 98%)    General Exam:   General appearance: No acute distress                   Mental Status: AAOx3, speech fluent, follows commands    Cranial Nerves: PERRL, EOMI, VFF, no facial asymmetry, tongue midline, No dysarthria.    Motor:   Tone: normal.                  Strength:   5/5 throughout    Sensation: intact to light touch and pinprick x 4 extremities. No sensory level.     Deep Tendon Reflexes: 2+ bilateral biceps, triceps, brachioradialis, 3+ b/l knee, 2+ L ankle, 3+ R ankle   Toes extensor b/l. + 1-2 beats of clonus L ankle    Coord: No ataxia/dysmetria FTN b/l      Other:   T4, seum- 7.0  Free Triiodothyronine, serum- 2.52    Borrelia burgdorferi IgG/IgM Antibodies (12.22.17 @ 21:59)    LYME IgG/IgM Antibodies Result: 0.15 Index    Lyme C6 Interpretation: Negative: METHOD: KASANDRA      Reference Range: (values expressed as Lyme Index )                                < 0.90        Negative                                0.90 - 1.09   Equivocal                                >= 1.10        Positive      CDC/ASTPHLDGuidelines recommend that all samples judged equivocal or      positive be re-tested by immunoblot for confirmation of results.    Vitamin B12, Serum (12.22.17 @ 21:59)    Vitamin B12, Serum: 452: Note: Reference Range Change on 12/18/2017. pg/mL    Sedimentation Rate, Erythrocyte: 12 mm/hr (12.22.17 @ 22:05)    Thyroid Stimulating Hormone, Serum (12.22.17 @ 21:59)    Thyroid Stimulating Hormone, Serum: 4.82 uIU/mL        Radiology    CT chest/abdomen/pelvis:  Bilateral nonobstructing intrarenal calculi.    An indeterminate 3 mm left upper lobe pulmonary nodule. In a patient with   risk factors for which follow-up is desired, noncontrast chest CT may be   repeated in 12 months to confirm stability.    < end of copied text >    MRI brain No acute intracranial hemorrhage, mass effect, or evidence of   acute territorial infarct.    No abnormal parenchymal or leptomeningeal enhancement    Nonenhancing scattered nonspecific foci of T2 and FLAIR hyperintense   signal in the periventricular and subcortical cerebral white matter, some   of which are ovoid in shape. Given the presence of enhancing   intramedullary focus of signal abnormality in the thoracic cord,   demyelinating disease is favored. Differential diagnosis includes   ischemic, infectious, inflammatory, and post infectious/inflammatory   processes.

## 2017-12-24 NOTE — DISCHARGE NOTE ADULT - PATIENT PORTAL LINK FT
“You can access the [facility] Patient Portal, offered by [facility], by registering with the following website: [email or web address]”

## 2017-12-24 NOTE — DISCHARGE NOTE ADULT - CARE PLAN
Principal Discharge DX:	Demyelinating disease  Goal:	management and prevention of symptoms  Instructions for follow-up, activity and diet:	Please make appointment with MS specialist, Dr. Guilherme Guillaume, [phone]

## 2017-12-24 NOTE — DISCHARGE NOTE ADULT - PLAN OF CARE
management and prevention of symptoms Please make appointment with MS specialist, Dr. Guilherme Guillaume, [phone]

## 2017-12-24 NOTE — DISCHARGE NOTE ADULT - HOSPITAL COURSE
58 y/o [place] man with no significant PMH p/w 5 days of bilateral leg weakness and burning sensation in legs and feet. Pt had gradual onset on weakness. He first noticed it while he was driving home from work, felt that he couldn't lift his leg up to push the gas pedal. Associated with burning sensation on the lateral side of his left leg down into the foot, also in the right foot across the side and top of the foot. He says the weakness comes and goes and is better at times. Denies any numbness in the legs, groin numbness, back pain, bowel or bladder incontinence. No recent fevers, URI, or diarrhea.     He was admitted to the neurology service for further work up.  MRI spine w/ contrast showing Nonspecific abnormal signal intensity and enhancement of the right nory-thoracic cord at the level of T3-T4, may represent an infectious inflammatory or demyelinating process.   MRI brain w/wo contrast showed No acute intracranial hemorrhage, mass effect, or evidence of  acute territorial infarct. No abnormal parenchymal or leptomeningeal enhancement  Nonenhancing scattered nonspecific foci of T2 and FLAIR hyperintense  signal in the periventricular and subcortical cerebral white matter, some  of which are ovoid in shape. Given the presence of enhancing  intramedullary focus of signal abnormality in the thoracic cord, demyelinating disease is favored. Differential diagnosis includes  ischemic, infectious, inflammatory, and post infectious/inflammatory processes.    LP was done at bedside and showed CSF lymphocytes at 88. Culture and gram stain were negative. HSV PCR was negative. The rest to be followed up with MS specialist Dr. Guillaume who is an MS specialist.   CT/Abdomen/Chest revealed a Bilateral non obstructing intrarenal calculi. An indeterminate 3 mm left upper lobe pulmonary nodule. In a patient with  risk factors for which follow-up is desired, noncontrast chest CT may be repeated in 12 months to confirm stability. Pulmonology consult was obtained and they recommended ________.     He was put on a 3 day course of IV solumedrol 1g daily. Last dose 12/24/2017. Regained his strengh to 5/5 throughout. Patient is now safe for discharge to home and to make an appointment with Dr. Guillaume at [phone] to follow up pending CSF results. 56 y/o [place] man with no significant PMH p/w 5 days of bilateral leg weakness and burning sensation in legs and feet. Pt had gradual onset on weakness. He first noticed it while he was driving home from work, felt that he couldn't lift his leg up to push the gas pedal. Associated with burning sensation on the lateral side of his left leg down into the foot, also in the right foot across the side and top of the foot. He says the weakness comes and goes and is better at times. Denies any numbness in the legs, groin numbness, back pain, bowel or bladder incontinence. No recent fevers, URI, or diarrhea.     He was admitted to the neurology service for further work up.  MRI spine w/ contrast showing Nonspecific abnormal signal intensity and enhancement of the right nory-thoracic cord at the level of T3-T4, may represent an infectious inflammatory or demyelinating process.   MRI brain w/wo contrast showed No acute intracranial hemorrhage, mass effect, or evidence of  acute territorial infarct. No abnormal parenchymal or leptomeningeal enhancement  Nonenhancing scattered nonspecific foci of T2 and FLAIR hyperintense  signal in the periventricular and subcortical cerebral white matter, some  of which are ovoid in shape. Given the presence of enhancing  intramedullary focus of signal abnormality in the thoracic cord, demyelinating disease is favored. Differential diagnosis includes  ischemic, infectious, inflammatory, and post infectious/inflammatory processes.    LP was done at bedside and showed CSF lymphocytes at 88. Culture and gram stain were negative. HSV PCR was negative. The rest to be followed up with MS specialist Dr. Guillaume who is an MS specialist.   CT/Abdomen/Chest revealed a Bilateral non obstructing intrarenal calculi. An indeterminate 3 mm left upper lobe pulmonary nodule. In a patient with  risk factors for which follow-up is desired, noncontrast chest CT may be repeated in 12 months to confirm stability. Pulmonology consult was obtained and they recommended no further f/u.     He was put on a 3 day course of IV solumedrol 1g daily. Last dose 12/24/2017. Regained his strengh to 5/5 throughout. Patient is now safe for discharge to home and to make an appointment with Dr. Guillaume at [phone] to follow up pending CSF results.

## 2017-12-24 NOTE — PROGRESS NOTE ADULT - ASSESSMENT
56 y/o man with no significant PMH p/w 5 days of bilateral weakness and paresthesias. Exam reveals hyperreflexia and extensor plantar response concerning for myelopathy. MRI spine results suggestive of transverse myelitis in R nory-thoracic cord at T3-4. Neurologic exam is stable. MRI brain shows nonenhancing scattered nonspecific foci of T2 and FLAIR hyperintense signal in the periventricular and subcortical cerebral white matter, some  of which are ovoid in shape. Given the presence of enhancing   intramedullary focus of signal abnormality in the thoracic cord,  demyelinating disease is favored. CT chest shows 3mm left pulmonary nodule. Patient improving after steroids, 5/5 strength throughout today.      Plan:  Pulm consult  s/p LP 12/23- CSF gluc, prot, cell count, gram stain, HSV, cytology, flow cytometry, MBP, IgG index, oligoclonal bands  - Send serum NMO Ab  - TSH mildly elevated- FT3/T4 wnl  - F/u remaining rheumatologic workup   -physical therapy  -continue with IV solumedrol 1g daily, last dose to be given tomorrow morning and then discharge home with f/u with Dr. Martinez

## 2017-12-24 NOTE — CONSULT NOTE ADULT - ASSESSMENT
56 yo M with no significant PMH. Presents with 5 days of bilateral leg weakness being treated for demyelinating disease. Found to have 3 mm GRAYSON nodule.     1) Lung nodule 56 yo M with no significant PMH. Presents with 5 days of bilateral leg weakness being treated for demyelinating disease. Found to have 3 mm GRAYSON nodule.     1) Lung nodule  - pt is asymptomatic from a respiratory perspective  - low risk pt, non-smoker, no other exposures  - nodule is 3 mm, and given his low risk status as per the fleischner 2017 guidelines this nodule does not require any further followup  - will sign off, please call with any further questions    2) Transverse myelitis  - care as per primary team 58 yo M with no significant PMH. Presents with 5 days of bilateral leg weakness being treated for demyelinating disease. Found to have 3 mm GRAYSON nodule.     1) Lung nodule  - pt is asymptomatic from a respiratory perspective  - low risk pt, non-smoker, no other exposures  - nodule is 3 mm, and given his low risk status as per the fleischner 2017 guidelines this nodule does not require any further followup  - will sign off, please call with any further questions    2) Transverse myelitis  - care as per primary team      Discussed w/ neuro resident

## 2017-12-24 NOTE — CONSULT NOTE ADULT - ATTENDING COMMENTS
incidental 3mm lung nodule in a nonsmoking, low risk pt  does not require follow up by current guidlines
Patient seen and examined and HPI/exam/A/P edited as needed.    MRI suggests TM and need full w/u and will obtain MRI brain, LP and full rheum w/u.

## 2017-12-25 VITALS
SYSTOLIC BLOOD PRESSURE: 130 MMHG | DIASTOLIC BLOOD PRESSURE: 72 MMHG | TEMPERATURE: 99 F | OXYGEN SATURATION: 98 % | RESPIRATION RATE: 18 BRPM | HEART RATE: 69 BPM

## 2017-12-25 LAB — GLUCOSE BLDC GLUCOMTR-MCNC: 130 MG/DL — HIGH (ref 70–99)

## 2017-12-25 PROCEDURE — 85652 RBC SED RATE AUTOMATED: CPT

## 2017-12-25 PROCEDURE — 84166 PROTEIN E-PHORESIS/URINE/CSF: CPT

## 2017-12-25 PROCEDURE — A9585: CPT

## 2017-12-25 PROCEDURE — 82652 VIT D 1 25-DIHYDROXY: CPT

## 2017-12-25 PROCEDURE — 88108 CYTOPATH CONCENTRATE TECH: CPT

## 2017-12-25 PROCEDURE — 70553 MRI BRAIN STEM W/O & W/DYE: CPT

## 2017-12-25 PROCEDURE — 80053 COMPREHEN METABOLIC PANEL: CPT

## 2017-12-25 PROCEDURE — 84436 ASSAY OF TOTAL THYROXINE: CPT

## 2017-12-25 PROCEDURE — 72157 MRI CHEST SPINE W/O & W/DYE: CPT

## 2017-12-25 PROCEDURE — 86235 NUCLEAR ANTIGEN ANTIBODY: CPT

## 2017-12-25 PROCEDURE — 82525 ASSAY OF COPPER: CPT

## 2017-12-25 PROCEDURE — 82550 ASSAY OF CK (CPK): CPT

## 2017-12-25 PROCEDURE — 87799 DETECT AGENT NOS DNA QUANT: CPT

## 2017-12-25 PROCEDURE — 72158 MRI LUMBAR SPINE W/O & W/DYE: CPT

## 2017-12-25 PROCEDURE — 86255 FLUORESCENT ANTIBODY SCREEN: CPT

## 2017-12-25 PROCEDURE — G0378: CPT

## 2017-12-25 PROCEDURE — 88185 FLOWCYTOMETRY/TC ADD-ON: CPT

## 2017-12-25 PROCEDURE — 84443 ASSAY THYROID STIM HORMONE: CPT

## 2017-12-25 PROCEDURE — 70450 CT HEAD/BRAIN W/O DYE: CPT

## 2017-12-25 PROCEDURE — 87529 HSV DNA AMP PROBE: CPT

## 2017-12-25 PROCEDURE — 89051 BODY FLUID CELL COUNT: CPT

## 2017-12-25 PROCEDURE — 99285 EMERGENCY DEPT VISIT HI MDM: CPT | Mod: 25

## 2017-12-25 PROCEDURE — 86036 ANCA SCREEN EACH ANTIBODY: CPT

## 2017-12-25 PROCEDURE — 74177 CT ABD & PELVIS W/CONTRAST: CPT

## 2017-12-25 PROCEDURE — 87205 SMEAR GRAM STAIN: CPT

## 2017-12-25 PROCEDURE — 97161 PT EVAL LOW COMPLEX 20 MIN: CPT

## 2017-12-25 PROCEDURE — 83921 ORGANIC ACID SINGLE QUANT: CPT

## 2017-12-25 PROCEDURE — 84481 FREE ASSAY (FT-3): CPT

## 2017-12-25 PROCEDURE — 82962 GLUCOSE BLOOD TEST: CPT

## 2017-12-25 PROCEDURE — 84446 ASSAY OF VITAMIN E: CPT

## 2017-12-25 PROCEDURE — 82390 ASSAY OF CERULOPLASMIN: CPT

## 2017-12-25 PROCEDURE — 86431 RHEUMATOID FACTOR QUANT: CPT

## 2017-12-25 PROCEDURE — 86618 LYME DISEASE ANTIBODY: CPT

## 2017-12-25 PROCEDURE — 84157 ASSAY OF PROTEIN OTHER: CPT

## 2017-12-25 PROCEDURE — 87070 CULTURE OTHR SPECIMN AEROBIC: CPT

## 2017-12-25 PROCEDURE — 88184 FLOWCYTOMETRY/ TC 1 MARKER: CPT

## 2017-12-25 PROCEDURE — 82945 GLUCOSE OTHER FLUID: CPT

## 2017-12-25 PROCEDURE — 86140 C-REACTIVE PROTEIN: CPT

## 2017-12-25 PROCEDURE — 86780 TREPONEMA PALLIDUM: CPT

## 2017-12-25 PROCEDURE — 72156 MRI NECK SPINE W/O & W/DYE: CPT

## 2017-12-25 PROCEDURE — 86038 ANTINUCLEAR ANTIBODIES: CPT

## 2017-12-25 PROCEDURE — 99239 HOSP IP/OBS DSCHRG MGMT >30: CPT

## 2017-12-25 PROCEDURE — 71260 CT THORAX DX C+: CPT

## 2017-12-25 PROCEDURE — 83873 ASSAY OF CSF PROTEIN: CPT

## 2017-12-25 PROCEDURE — 87116 MYCOBACTERIA CULTURE: CPT

## 2017-12-25 PROCEDURE — 85027 COMPLETE CBC AUTOMATED: CPT

## 2017-12-25 PROCEDURE — 82607 VITAMIN B-12: CPT

## 2017-12-25 RX ADMIN — PANTOPRAZOLE SODIUM 40 MILLIGRAM(S): 20 TABLET, DELAYED RELEASE ORAL at 05:30

## 2017-12-25 RX ADMIN — Medication 58 MILLIGRAM(S): at 05:30

## 2017-12-25 RX ADMIN — SODIUM CHLORIDE 3 MILLILITER(S): 9 INJECTION INTRAMUSCULAR; INTRAVENOUS; SUBCUTANEOUS at 05:31

## 2017-12-25 NOTE — PROGRESS NOTE ADULT - SUBJECTIVE AND OBJECTIVE BOX
Neurology Follow up note    Name  RAAD ESPINOSA        Subjective: Saw and examined patient at bedside. Patient reports that his weakness is much better. ambulating    MEDICATIONS  (STANDING):  influenza   Vaccine 0.5 milliLiter(s) IntraMuscular once  insulin lispro (HumaLOG) corrective regimen sliding scale   SubCutaneous three times a day before meals  methylPREDNISolone sodium succinate IVPB 1000 milliGRAM(s) IV Intermittent daily  pantoprazole    Tablet 40 milliGRAM(s) Oral before breakfast  sodium chloride 0.9% lock flush 3 milliLiter(s) IV Push every 12 hours    MEDICATIONS  (PRN):      Allergies    No Known Allergies    Intolerances    Vital Signs Last 24 Hrs  T(C): 36.8 (25 Dec 2017 04:45), Max: 37 (24 Dec 2017 13:45)  T(F): 98.2 (25 Dec 2017 04:45), Max: 98.6 (24 Dec 2017 13:45)  HR: 73 (25 Dec 2017 04:45) (73 - 103)  BP: 114/70 (25 Dec 2017 04:45) (114/70 - 144/81)  BP(mean): --  RR: 18 (25 Dec 2017 04:45) (18 - 18)  SpO2: 95% (25 Dec 2017 04:45) (95% - 98%)    General Exam:   General appearance: No acute distress                   Mental Status: AAOx3, speech fluent, follows commands    Cranial Nerves: PERRL, EOMI, VFF, no facial asymmetry, tongue midline, No dysarthria.    Motor:   Tone: normal.                  Strength:   5/5 throughout    Sensation: intact to light touch and pinprick x 4 extremities. No sensory level.     Deep Tendon Reflexes: 2+ bilateral biceps, triceps, brachioradialis, 3+ b/l knee, 2+ L ankle, 3+ R ankle   Toes extensor b/l. + 1-2 beats of clonus L ankle    Coord: No ataxia/dysmetria FTN b/l      Other:   T4, seum- 7.0  Free Triiodothyronine, serum- 2.52    Borrelia burgdorferi IgG/IgM Antibodies (12.22.17 @ 21:59)    LYME IgG/IgM Antibodies Result: 0.15 Index    Lyme C6 Interpretation: Negative: METHOD: KASANDRA      Reference Range: (values expressed as Lyme Index )                                < 0.90        Negative                                0.90 - 1.09   Equivocal                                >= 1.10        Positive      CDC/ASTPHLDGuidelines recommend that all samples judged equivocal or      positive be re-tested by immunoblot for confirmation of results.    Vitamin B12, Serum (12.22.17 @ 21:59)    Vitamin B12, Serum: 452: Note: Reference Range Change on 12/18/2017. pg/mL    Sedimentation Rate, Erythrocyte: 12 mm/hr (12.22.17 @ 22:05)    Thyroid Stimulating Hormone, Serum (12.22.17 @ 21:59)    Thyroid Stimulating Hormone, Serum: 4.82 uIU/mL        Radiology    CT chest/abdomen/pelvis:  Bilateral nonobstructing intrarenal calculi.    An indeterminate 3 mm left upper lobe pulmonary nodule. In a patient with   risk factors for which follow-up is desired, noncontrast chest CT may be   repeated in 12 months to confirm stability.    < end of copied text >    MRI brain No acute intracranial hemorrhage, mass effect, or evidence of   acute territorial infarct.    No abnormal parenchymal or leptomeningeal enhancement    Nonenhancing scattered nonspecific foci of T2 and FLAIR hyperintense   signal in the periventricular and subcortical cerebral white matter, some   of which are ovoid in shape. Given the presence of enhancing   intramedullary focus of signal abnormality in the thoracic cord,   demyelinating disease is favored. Differential diagnosis includes   ischemic, infectious, inflammatory, and post infectious/inflammatory   processes.

## 2017-12-26 PROBLEM — Z00.00 ENCOUNTER FOR PREVENTIVE HEALTH EXAMINATION: Status: ACTIVE | Noted: 2017-12-26

## 2017-12-26 LAB
ALBUMIN CSF-MCNC: 21.3 MG/DL — SIGNIFICANT CHANGE UP (ref 14–25)
ALBUMIN SERPL ELPH-MCNC: 4660 MG/DL — SIGNIFICANT CHANGE UP (ref 3500–5200)
CULTURE RESULTS: NO GROWTH — SIGNIFICANT CHANGE UP
IGG CSF-MCNC: 6.4 MG/DL — HIGH
IGG FLD-MCNC: 1310 MG/DL — SIGNIFICANT CHANGE UP (ref 694–1618)
IGG SYNTH RATE SER+CSF CALC-MRATE: 13.6 MG/DAY — HIGH
IGG/ALB CLEAR SER+CSF-RTO: 1.1 — HIGH
IGG/ALB CSF: 0.3 RATIO — HIGH
IGG/ALB SER: 0.28 RATIO — SIGNIFICANT CHANGE UP
JCPYV DNA # CSF NAA+PROBE: SIGNIFICANT CHANGE UP
NON-GYNECOLOGICAL CYTOLOGY STUDY: SIGNIFICANT CHANGE UP
PROT CSF-MCNC: 39 MG/DL — SIGNIFICANT CHANGE UP (ref 15–45)
SPECIMEN SOURCE: SIGNIFICANT CHANGE UP

## 2017-12-27 ENCOUNTER — APPOINTMENT (OUTPATIENT)
Dept: NEUROLOGY | Facility: CLINIC | Age: 57
End: 2017-12-27
Payer: SELF-PAY

## 2017-12-27 VITALS
BODY MASS INDEX: 24.36 KG/M2 | HEIGHT: 76 IN | WEIGHT: 200 LBS | HEART RATE: 64 BPM | SYSTOLIC BLOOD PRESSURE: 123 MMHG | DIASTOLIC BLOOD PRESSURE: 80 MMHG

## 2017-12-27 DIAGNOSIS — G35 MULTIPLE SCLEROSIS: ICD-10-CM

## 2017-12-27 LAB
AQP4 H2O CHANNEL AB SERPL IA-ACNC: NEGATIVE — SIGNIFICANT CHANGE UP
COPPER SERPL-MCNC: 87 UG/DL — SIGNIFICANT CHANGE UP (ref 72–166)
MBP CSF-MCNC: < 2 MCG/L — SIGNIFICANT CHANGE UP
METHYLMALONATE SERPL-SCNC: 127 NMOL/L — SIGNIFICANT CHANGE UP (ref 0–378)
OLIGOCLONAL BANDS CSF ELPH-IMP: PRESENT — SIGNIFICANT CHANGE UP
OLIGOCLONAL BANDS CSF ELPH-IMP: SIGNIFICANT CHANGE UP
TM INTERPRETATION: SIGNIFICANT CHANGE UP

## 2017-12-27 PROCEDURE — 99215 OFFICE O/P EST HI 40 MIN: CPT

## 2017-12-28 LAB
A-TOCOPHEROL VIT E SERPL-MCNC: 13.4 MG/L — SIGNIFICANT CHANGE UP (ref 5.7–19.9)
BETA+GAMMA TOCOPHEROL SERPL-MCNC: <1 MG/L — SIGNIFICANT CHANGE UP

## 2018-01-01 NOTE — ED ADULT NURSE NOTE - CARDIO ASSESSMENT
6 month old baby girl with Infantile Leukemia enrolled on [facility] [id number], currently in DI, day 1 today. WDL

## 2018-01-26 ENCOUNTER — APPOINTMENT (OUTPATIENT)
Dept: NEUROLOGY | Facility: CLINIC | Age: 58
End: 2018-01-26
Payer: SELF-PAY

## 2018-01-26 VITALS
WEIGHT: 194 LBS | HEIGHT: 76 IN | SYSTOLIC BLOOD PRESSURE: 123 MMHG | HEART RATE: 70 BPM | TEMPERATURE: 98.7 F | OXYGEN SATURATION: 96 % | BODY MASS INDEX: 23.62 KG/M2 | DIASTOLIC BLOOD PRESSURE: 77 MMHG

## 2018-01-26 PROCEDURE — 99213 OFFICE O/P EST LOW 20 MIN: CPT

## 2018-01-26 RX ORDER — GABAPENTIN 100 MG/1
100 CAPSULE ORAL 3 TIMES DAILY
Qty: 270 | Refills: 1 | Status: ACTIVE | COMMUNITY
Start: 2018-01-26 | End: 1900-01-01

## 2018-02-10 LAB
CULTURE RESULTS: SIGNIFICANT CHANGE UP
SPECIMEN SOURCE: SIGNIFICANT CHANGE UP

## 2018-02-15 NOTE — PROGRESS NOTE ADULT - ASSESSMENT
56 y/o man with no significant PMH p/w 5 days of bilateral weakness and paresthesias. Exam reveals hyperreflexia and extensor plantar response concerning for myelopathy. MRI spine results suggestive of transverse myelitis in R nory-thoracic cord at T3-4. Neurologic exam is stable. MRI brain shows nonenhancing scattered nonspecific foci of T2 and FLAIR hyperintense signal in the periventricular and subcortical cerebral white matter, some  of which are ovoid in shape. Given the presence of enhancing   intramedullary focus of signal abnormality in the thoracic cord,  demyelinating disease is favored. CT chest shows 3mm left pulmonary nodule. Patient improving after steroids, 5/5 strength throughout today.      Plan:  Pulm consult: no further w/u for nodule  s/p LP 12/23- CSF gluc, prot, cell count, gram stain, HSV, cytology, flow cytometry, MBP, IgG index, oligoclonal bands  - Send serum NMO Ab  - TSH mildly elevated- FT3/T4 wnl  - F/u remaining rheumatologic workup   -physical therapy  -continue with IV solumedrol 1g daily, last dose to be given was today nd then discharge home with f/u with Dr. Martinez no

## 2019-05-14 ENCOUNTER — APPOINTMENT (OUTPATIENT)
Dept: MRI IMAGING | Facility: IMAGING CENTER | Age: 59
End: 2019-05-14
Payer: MEDICAID

## 2019-05-14 ENCOUNTER — OUTPATIENT (OUTPATIENT)
Dept: OUTPATIENT SERVICES | Facility: HOSPITAL | Age: 59
LOS: 1 days | End: 2019-05-14
Payer: MEDICAID

## 2019-05-14 DIAGNOSIS — Z00.8 ENCOUNTER FOR OTHER GENERAL EXAMINATION: ICD-10-CM

## 2019-05-14 PROCEDURE — 72157 MRI CHEST SPINE W/O & W/DYE: CPT | Mod: 26

## 2019-05-14 PROCEDURE — 72156 MRI NECK SPINE W/O & W/DYE: CPT | Mod: 26

## 2019-05-14 PROCEDURE — 70553 MRI BRAIN STEM W/O & W/DYE: CPT | Mod: 26

## 2019-05-14 PROCEDURE — 72156 MRI NECK SPINE W/O & W/DYE: CPT

## 2019-05-14 PROCEDURE — 72157 MRI CHEST SPINE W/O & W/DYE: CPT

## 2019-05-14 PROCEDURE — A9585: CPT

## 2019-05-14 PROCEDURE — 70553 MRI BRAIN STEM W/O & W/DYE: CPT

## 2020-02-06 ENCOUNTER — EMERGENCY (EMERGENCY)
Facility: HOSPITAL | Age: 60
LOS: 1 days | Discharge: HOME | End: 2020-02-06
Attending: STUDENT IN AN ORGANIZED HEALTH CARE EDUCATION/TRAINING PROGRAM
Payer: MEDICAID

## 2020-02-06 VITALS
SYSTOLIC BLOOD PRESSURE: 125 MMHG | RESPIRATION RATE: 20 BRPM | OXYGEN SATURATION: 100 % | DIASTOLIC BLOOD PRESSURE: 100 MMHG | HEART RATE: 58 BPM | TEMPERATURE: 98 F

## 2020-02-06 VITALS
TEMPERATURE: 96 F | WEIGHT: 178.57 LBS | HEART RATE: 74 BPM | DIASTOLIC BLOOD PRESSURE: 90 MMHG | OXYGEN SATURATION: 98 % | RESPIRATION RATE: 20 BRPM | HEIGHT: 66 IN | SYSTOLIC BLOOD PRESSURE: 146 MMHG

## 2020-02-06 PROCEDURE — 71250 CT THORAX DX C-: CPT | Mod: 26

## 2020-02-06 PROCEDURE — 71250 CT THORAX DX C-: CPT

## 2020-02-06 PROCEDURE — 99284 EMERGENCY DEPT VISIT MOD MDM: CPT

## 2020-02-06 PROCEDURE — 93005 ELECTROCARDIOGRAM TRACING: CPT

## 2020-02-06 PROCEDURE — 93010 ELECTROCARDIOGRAM REPORT: CPT

## 2020-02-06 NOTE — ED PROVIDER NOTE - PHYSICAL EXAMINATION
General: Alert and Oriented. No apparent distress.  Head: Normocephalic and atraumatic.  Eyes: PERRLA with EOMI.  Neck: Supple. Trachea midline.   Cardiac: Normal S1 and S2 w/ RRR. No murmurs appreciated.   Pulmonary: Vesicular breath sounds bilaterally. No increased WOB. No wheezes or crackles.  Abdominal: Soft, non-tender.  Neurologic: No focal sensory or motor deficits.  Musculoskeletal: Strength appropriate in all 4 extremities for age with no limited ROM.  Skin: Color appropriate for race. Intact, warm, and well-perfused. No erythema or edema of the left upper chest notable on exam

## 2020-02-06 NOTE — ED ADULT NURSE NOTE - OBJECTIVE STATEMENT
The pt. is a 58 y/o M PMH MS c/o chest pain x 3 days. Pt. denies radiation to the jaw, shoulder, arm, weakness, SOB, n/v, fever, chills. Upon assessment, pt. is AO x 4, clear lung sounds, s1 s2 heart sounds, abd soft and nontender, equal strength bilaterally. Pt. states, "I was told by a doctor I have a mass, but I never got the Ct scan to follow up." Pt. is resting comfortably, sinus rhythm on tele, will continue to monitor.

## 2020-02-06 NOTE — ED PROVIDER NOTE - NSFOLLOWUPINSTRUCTIONS_ED_ALL_ED_FT
Please call or see your doctor or return to the ER if you have new chest pain, shortness of breath, fevers, inability to eat or drink, or worsening of symptoms that brought you to the emergency room today.    Please follow up with your primary care physician in 1-2 days or as soon as possible.

## 2020-02-06 NOTE — ED PROVIDER NOTE - CARE PROVIDER_API CALL
Juan C Hardin)  Infectious Disease; Internal Medicine  [address] [place] [id number]  Phone: [phone]  Fax: [phone]  Follow Up Time:

## 2020-02-06 NOTE — ED PROVIDER NOTE - ATTENDING CONTRIBUTION TO CARE
presents to the ER for eval for lung nodule.   Pt denies chest pain, abdominal pain, shortness of breath, lightheadedness, vomiting, leg swelling, fever, headache, slurred speech, weakness or blurry vision.   pt also reports he has a lump in his L chest in the pectoralis muscle  no overlying erythema, no tenderness, less than 1cm in size  plan for ct scan for lung nodule (Has script dated 5-2019) from Dr. Juan C Galeano  lung nodule stable in size, stable for dc home

## 2020-02-06 NOTE — ED PROVIDER NOTE - PATIENT PORTAL LINK FT
You can access the FollowMyHealth Patient Portal offered by [facility] by registering at the following website: [email or web address]. By joining [employer]’s FollowMyHealth portal, you will also be able to view your health information using other applications (apps) compatible with our system.

## 2020-02-06 NOTE — ED ADULT NURSE NOTE - CHPI ED NUR SYMPTOMS NEG
no congestion/no nausea/no syncope/no back pain/no chills/no shortness of breath/no dizziness/no fever/no vomiting/no diaphoresis

## 2020-02-06 NOTE — ED PROVIDER NOTE - OBJECTIVE STATEMENT
60 yo M hx of MS recently diagnosed on tecfidera, follows with a PMD, c/o a "bump" in the left chest. Presents with paperwork for CT non contrast of the chest to follow up on a lung nodule found during an admission in 2017. No skin changes over left chest, no drainage. States PMD sent him in for imaging. Denies CP, SoB, recent fevers, chills, n/v/d, difficulty urinating, weight loss, night sweats, testicular pain or massess, change in appetite.      PMD: Dr Juan C Hardin

## 2020-02-06 NOTE — ED PROVIDER NOTE - CLINICAL SUMMARY MEDICAL DECISION MAKING FREE TEXT BOX
Pt p/w papers for a follow up study. Low suspicion for acute pathology as pt denies acute pain or sob or fevers, on exam in NAD, will obtain CT scan and reassess.

## 2021-01-26 NOTE — PROCEDURE NOTE - NSTOLERANCE_GEN_A_CORE
Thank you for involving us in your care today.  You may receive a survey about this office visit.  Please let us know what we did well and what we could improve.  We value your observations.  Take care.      Pharmacological nuclear stress test   Patient tolerated procedure well.

## 2022-03-10 NOTE — ED CDU PROVIDER SUBSEQUENT DAY NOTE - CONSTITUTIONAL, MLM
normal... Well appearing, well nourished, awake, alert, oriented to person, place, time/situation and in no apparent distress. Detail Level: Detailed

## 2022-08-11 NOTE — PATIENT PROFILE ADULT. - MENTAL HEALTH CONDITIONS/SYMPTOMS, PROFILE
Detail Level: Detailed none Mart-1 - Positive Histology Text: MART-1 staining demonstrates areas of higher density and clustering of melanocytes with Pagetoid spread upwards within the epidermis. The surgical margins are positive for tumor cells.

## 2022-09-28 ENCOUNTER — APPOINTMENT (OUTPATIENT)
Dept: MRI IMAGING | Facility: CLINIC | Age: 62
End: 2022-09-28

## 2022-09-28 ENCOUNTER — OUTPATIENT (OUTPATIENT)
Dept: OUTPATIENT SERVICES | Facility: HOSPITAL | Age: 62
LOS: 1 days | End: 2022-09-28
Payer: MEDICAID

## 2022-09-28 DIAGNOSIS — Z00.8 ENCOUNTER FOR OTHER GENERAL EXAMINATION: ICD-10-CM

## 2022-09-28 PROCEDURE — 70553 MRI BRAIN STEM W/O & W/DYE: CPT | Mod: 26

## 2022-09-28 PROCEDURE — 70553 MRI BRAIN STEM W/O & W/DYE: CPT

## 2022-09-28 PROCEDURE — A9585: CPT

## 2022-10-20 ENCOUNTER — APPOINTMENT (OUTPATIENT)
Dept: MRI IMAGING | Facility: CLINIC | Age: 62
End: 2022-10-20

## 2023-05-02 ENCOUNTER — OFFICE (OUTPATIENT)
Dept: URBAN - METROPOLITAN AREA CLINIC 77 | Facility: CLINIC | Age: 63
Setting detail: OPHTHALMOLOGY
End: 2023-05-02
Payer: COMMERCIAL

## 2023-05-02 DIAGNOSIS — T15.02XA: ICD-10-CM

## 2023-05-02 DIAGNOSIS — H25.13: ICD-10-CM

## 2023-05-02 DIAGNOSIS — H40.013: ICD-10-CM

## 2023-05-02 DIAGNOSIS — H35.013: ICD-10-CM

## 2023-05-02 DIAGNOSIS — H11.151: ICD-10-CM

## 2023-05-02 PROCEDURE — 92201 OPSCPY EXTND RTA DRAW UNI/BI: CPT | Performed by: OPHTHALMOLOGY

## 2023-05-02 PROCEDURE — 92004 COMPRE OPH EXAM NEW PT 1/>: CPT | Performed by: OPHTHALMOLOGY

## 2023-05-02 PROCEDURE — 65222 REMOVE FOREIGN BODY FROM EYE: CPT | Performed by: OPHTHALMOLOGY

## 2023-05-02 PROCEDURE — 92285 EXTERNAL OCULAR PHOTOGRAPHY: CPT | Performed by: OPHTHALMOLOGY

## 2023-05-02 ASSESSMENT — SPHEQUIV_DERIVED
OS_SPHEQUIV: 0.5
OD_SPHEQUIV: 0.625
OD_SPHEQUIV: 0.625
OS_SPHEQUIV: 0.5

## 2023-05-02 ASSESSMENT — REFRACTION_AUTOREFRACTION
OS_SPHERE: +1.25
OS_AXIS: 056
OS_CYLINDER: -1.50
OD_AXIS: 086
OD_SPHERE: +1.00
OD_CYLINDER: -0.75

## 2023-05-02 ASSESSMENT — REFRACTION_MANIFEST
OD_ADD: +2.50
OS_SPHERE: +1.00
OD_VA1: 20/25
OS_VA1: 20/50
OD_AXIS: 85
OS_ADD: +2.50
OD_SPHERE: +1.00
OS_CYLINDER: -1.00
OD_CYLINDER: -0.75
OS_AXIS: 60

## 2023-05-02 ASSESSMENT — AXIALLENGTH_DERIVED
OS_AL: 23.3087
OS_AL: 23.3087
OD_AL: 23.306
OD_AL: 23.306

## 2023-05-02 ASSESSMENT — VISUAL ACUITY
OD_BCVA: 20/50
OS_BCVA: 20/40

## 2023-05-02 ASSESSMENT — KERATOMETRY
OS_K2POWER_DIOPTERS: 44.50
OD_AXISANGLE_DEGREES: 015
OD_K2POWER_DIOPTERS: 44.25
OD_K1POWER_DIOPTERS: 43.00
OS_AXISANGLE_DEGREES: 171
OS_K1POWER_DIOPTERS: 43.00

## 2023-05-02 ASSESSMENT — CORNEAL TRAUMA - FOREIGN BODY: OS_FOREIGNBODY: METALLIC W/RUST RUN PRESENT

## 2023-05-02 ASSESSMENT — LID EXAM ASSESSMENTS: OS_EDEMA: LLL LUL 2+

## 2023-05-02 ASSESSMENT — CONFRONTATIONAL VISUAL FIELD TEST (CVF)
OD_FINDINGS: FULL
OS_FINDINGS: FULL

## 2023-05-02 ASSESSMENT — TONOMETRY: OS_IOP_MMHG: 11

## 2023-05-04 ENCOUNTER — OFFICE (OUTPATIENT)
Dept: URBAN - METROPOLITAN AREA CLINIC 77 | Facility: CLINIC | Age: 63
Setting detail: OPHTHALMOLOGY
End: 2023-05-04
Payer: COMMERCIAL

## 2023-05-04 DIAGNOSIS — T15.02XD: ICD-10-CM

## 2023-05-04 DIAGNOSIS — H53.10: ICD-10-CM

## 2023-05-04 DIAGNOSIS — H35.013: ICD-10-CM

## 2023-05-04 DIAGNOSIS — H40.013: ICD-10-CM

## 2023-05-04 DIAGNOSIS — H25.13: ICD-10-CM

## 2023-05-04 DIAGNOSIS — H11.151: ICD-10-CM

## 2023-05-04 PROCEDURE — 92285 EXTERNAL OCULAR PHOTOGRAPHY: CPT | Performed by: OPHTHALMOLOGY

## 2023-05-04 PROCEDURE — 99213 OFFICE O/P EST LOW 20 MIN: CPT | Performed by: OPHTHALMOLOGY

## 2023-05-04 PROCEDURE — 65222 REMOVE FOREIGN BODY FROM EYE: CPT | Performed by: OPHTHALMOLOGY

## 2023-05-04 PROCEDURE — 92250 FUNDUS PHOTOGRAPHY W/I&R: CPT | Performed by: OPHTHALMOLOGY

## 2023-05-04 ASSESSMENT — REFRACTION_MANIFEST
OD_CYLINDER: -0.75
OS_CYLINDER: -1.00
OS_SPHERE: +1.00
OS_AXIS: 60
OS_VA1: 20/50
OD_AXIS: 85
OD_ADD: +2.50
OD_VA1: 20/25
OD_SPHERE: +1.00
OS_ADD: +2.50

## 2023-05-04 ASSESSMENT — REFRACTION_AUTOREFRACTION
OS_CYLINDER: -1.50
OS_AXIS: 056
OD_AXIS: 086
OD_SPHERE: +1.00
OD_CYLINDER: -0.75
OS_SPHERE: +1.25

## 2023-05-04 ASSESSMENT — CONFRONTATIONAL VISUAL FIELD TEST (CVF)
OD_FINDINGS: FULL
OS_FINDINGS: FULL

## 2023-05-04 ASSESSMENT — VISUAL ACUITY
OS_BCVA: 20/30
OD_BCVA: 20/60

## 2023-05-04 ASSESSMENT — AXIALLENGTH_DERIVED
OS_AL: 23.3087
OD_AL: 23.306
OD_AL: 23.306
OS_AL: 23.3087

## 2023-05-04 ASSESSMENT — SPHEQUIV_DERIVED
OS_SPHEQUIV: 0.5
OD_SPHEQUIV: 0.625
OD_SPHEQUIV: 0.625
OS_SPHEQUIV: 0.5

## 2023-05-04 ASSESSMENT — KERATOMETRY
OS_AXISANGLE_DEGREES: 171
OD_K2POWER_DIOPTERS: 44.25
OS_K2POWER_DIOPTERS: 44.50
OD_AXISANGLE_DEGREES: 015
OS_K1POWER_DIOPTERS: 43.00
OD_K1POWER_DIOPTERS: 43.00

## 2023-05-04 ASSESSMENT — CORNEAL TRAUMA - FOREIGN BODY: OS_FOREIGNBODY: PRESENT

## 2023-05-04 ASSESSMENT — LID EXAM ASSESSMENTS: OS_EDEMA: LLL LUL 2+

## 2023-05-10 ENCOUNTER — OFFICE (OUTPATIENT)
Facility: LOCATION | Age: 63
Setting detail: OPHTHALMOLOGY
End: 2023-05-10
Payer: MEDICAID

## 2023-05-10 DIAGNOSIS — T15.02XD: ICD-10-CM

## 2023-05-10 PROBLEM — H53.10 SUBJECTIVE VISUAL DISTRUBANCES ; LEFT EYE: Status: ACTIVE | Noted: 2023-05-04

## 2023-05-10 PROBLEM — H11.151 PINGUECULA; RIGHT EYE: Status: ACTIVE | Noted: 2023-05-02

## 2023-05-10 PROBLEM — H35.013 CHANGES IN RETINAL VASCULAR APPEARANCE; BOTH EYES: Status: ACTIVE | Noted: 2023-05-02

## 2023-05-10 PROBLEM — H25.13 CATARACT SENILE NUCLEAR SCLEROSIS; BOTH EYES: Status: ACTIVE | Noted: 2023-05-02

## 2023-05-10 PROBLEM — H40.013 GLAUCOMA SUSPECT, LOW RISK 1-2 FACTORS; BOTH EYES: Status: ACTIVE | Noted: 2023-05-02

## 2023-05-10 PROCEDURE — 99213 OFFICE O/P EST LOW 20 MIN: CPT | Performed by: OPHTHALMOLOGY

## 2023-05-10 PROCEDURE — 92285 EXTERNAL OCULAR PHOTOGRAPHY: CPT | Performed by: OPHTHALMOLOGY

## 2023-05-10 ASSESSMENT — KERATOMETRY
OS_AXISANGLE_DEGREES: 171
OS_K1POWER_DIOPTERS: 43.00
OD_AXISANGLE_DEGREES: 015
OD_K1POWER_DIOPTERS: 43.00
OS_K2POWER_DIOPTERS: 44.50
OD_K2POWER_DIOPTERS: 44.25

## 2023-05-10 ASSESSMENT — SPHEQUIV_DERIVED
OD_SPHEQUIV: 0.625
OD_SPHEQUIV: 0.625
OS_SPHEQUIV: 0.5
OS_SPHEQUIV: 0.5

## 2023-05-10 ASSESSMENT — AXIALLENGTH_DERIVED
OS_AL: 23.3087
OD_AL: 23.306
OD_AL: 23.306
OS_AL: 23.3087

## 2023-05-10 ASSESSMENT — REFRACTION_MANIFEST
OS_AXIS: 60
OD_SPHERE: +1.00
OD_VA1: 20/25
OD_ADD: +2.50
OS_SPHERE: +1.00
OD_AXIS: 85
OS_ADD: +2.50
OS_CYLINDER: -1.00
OS_VA1: 20/50
OD_CYLINDER: -0.75

## 2023-05-10 ASSESSMENT — REFRACTION_AUTOREFRACTION
OS_AXIS: 056
OD_SPHERE: +1.00
OD_CYLINDER: -0.75
OS_SPHERE: +1.25
OD_AXIS: 086
OS_CYLINDER: -1.50

## 2023-05-10 ASSESSMENT — CONFRONTATIONAL VISUAL FIELD TEST (CVF)
OS_FINDINGS: FULL
OD_FINDINGS: FULL

## 2023-05-10 ASSESSMENT — LID EXAM ASSESSMENTS: OS_EDEMA: LLL LUL 2+

## 2023-05-10 ASSESSMENT — VISUAL ACUITY
OD_BCVA: 20/20
OS_BCVA: 20/20-2

## 2023-09-24 NOTE — PATIENT PROFILE ADULT. - FUNCTIONAL LEVEL PRIOR: TOILETING
1- Moderate oral stage for puree, regular solids, moderately thick liquids, mildly thick liquids, and thin liquids marked by adequate oral containment, slow bolus manipulation, slow/prolonged gumming/mashing for regular solids likely exacerbated by edentulous state with patient eventually expectorating bolus from oral cavity into napkin, slow anterior to posterior transfer for puree, mild oral residue noted which clears with liquid wash. 2- Functional pharyngeal stage for puree and moderately thick liquids marked by initiation of the pharyngeal swallow with hyolaryngeal excursion upon palpation. 3- Severe pharyngeal stage for mildly thick liquids and thin liquids marked by suspected delayed initiation of the pharyngeal swallow with hyolaryngeal excursion upon palpation with coughing/throat clearing post oral intake suggestive of impaired airway protection.
(0) independent

## 2023-10-31 NOTE — ED CDU PROVIDER SUBSEQUENT DAY NOTE - CROS ED PSYCH ALL NEG
Problem: INFECTION - ADULT  Goal: Absence of fever/infection during neutropenic period  Description: INTERVENTIONS:  - Monitor WBC    Outcome: Progressing negative...